# Patient Record
Sex: FEMALE | Race: WHITE | HISPANIC OR LATINO | Employment: UNEMPLOYED | URBAN - METROPOLITAN AREA
[De-identification: names, ages, dates, MRNs, and addresses within clinical notes are randomized per-mention and may not be internally consistent; named-entity substitution may affect disease eponyms.]

---

## 2024-05-01 PROBLEM — O40.3XX0 POLYHYDRAMNIOS IN THIRD TRIMESTER: Status: ACTIVE | Noted: 2024-05-01

## 2024-05-06 PROBLEM — B95.1 POSITIVE GBS TEST: Status: ACTIVE | Noted: 2024-05-06

## 2024-05-07 ENCOUNTER — ROUTINE PRENATAL (OUTPATIENT)
Dept: OBSTETRICS AND GYNECOLOGY | Age: 29
End: 2024-05-07
Payer: COMMERCIAL

## 2024-05-07 ENCOUNTER — TELEPHONE (OUTPATIENT)
Dept: OBSTETRICS AND GYNECOLOGY | Age: 29
End: 2024-05-07

## 2024-05-07 VITALS — SYSTOLIC BLOOD PRESSURE: 124 MMHG | WEIGHT: 150.6 LBS | BODY MASS INDEX: 27.55 KG/M2 | DIASTOLIC BLOOD PRESSURE: 80 MMHG

## 2024-05-07 DIAGNOSIS — Z3A.37 37 WEEKS GESTATION OF PREGNANCY: Primary | ICD-10-CM

## 2024-05-07 LAB
GLUCOSE UR STRIP-MCNC: NEGATIVE MG/DL
PROT UR STRIP-MCNC: NEGATIVE MG/DL

## 2024-05-07 PROCEDURE — 0502F SUBSEQUENT PRENATAL CARE: CPT | Performed by: OBSTETRICS & GYNECOLOGY

## 2024-05-10 ENCOUNTER — TRANSCRIBE ORDERS (OUTPATIENT)
Dept: ULTRASOUND IMAGING | Facility: HOSPITAL | Age: 29
End: 2024-05-10
Payer: COMMERCIAL

## 2024-05-10 DIAGNOSIS — O40.3XX1 POLYHYDRAMNIOS IN THIRD TRIMESTER COMPLICATION, FETUS 1 OF MULTIPLE GESTATION: Primary | ICD-10-CM

## 2024-05-14 ENCOUNTER — ANESTHESIA EVENT (OUTPATIENT)
Dept: LABOR AND DELIVERY | Facility: HOSPITAL | Age: 29
End: 2024-05-14
Payer: COMMERCIAL

## 2024-05-14 ENCOUNTER — HOSPITAL ENCOUNTER (INPATIENT)
Facility: HOSPITAL | Age: 29
LOS: 3 days | Discharge: HOME | End: 2024-05-17
Attending: OBSTETRICS & GYNECOLOGY | Admitting: OBSTETRICS & GYNECOLOGY
Payer: COMMERCIAL

## 2024-05-14 ENCOUNTER — ANESTHESIA (OUTPATIENT)
Dept: LABOR AND DELIVERY | Facility: HOSPITAL | Age: 29
End: 2024-05-14
Payer: COMMERCIAL

## 2024-05-14 ENCOUNTER — ROUTINE PRENATAL (OUTPATIENT)
Dept: OBSTETRICS AND GYNECOLOGY | Age: 29
End: 2024-05-14
Payer: COMMERCIAL

## 2024-05-14 VITALS — BODY MASS INDEX: 28.86 KG/M2 | WEIGHT: 157.8 LBS | SYSTOLIC BLOOD PRESSURE: 116 MMHG | DIASTOLIC BLOOD PRESSURE: 72 MMHG

## 2024-05-14 DIAGNOSIS — O28.9 ABNORMAL ANTENATAL TEST: ICD-10-CM

## 2024-05-14 DIAGNOSIS — Z3A.38 38 WEEKS GESTATION OF PREGNANCY: Primary | ICD-10-CM

## 2024-05-14 DIAGNOSIS — O40.3XX0 POLYHYDRAMNIOS IN THIRD TRIMESTER COMPLICATION, SINGLE OR UNSPECIFIED FETUS: ICD-10-CM

## 2024-05-14 PROBLEM — Z34.90 PREGNANCY: Status: ACTIVE | Noted: 2024-05-14

## 2024-05-14 PROBLEM — O36.8130 DECREASED FETAL MOVEMENTS IN THIRD TRIMESTER: Status: ACTIVE | Noted: 2024-05-14

## 2024-05-14 LAB
ABO GROUP BLD: NORMAL
ALBUMIN SERPL-MCNC: 3.3 G/DL (ref 3.5–5.2)
ALBUMIN/GLOB SERPL: 0.9 G/DL
ALP SERPL-CCNC: 275 U/L (ref 39–117)
ALT SERPL W P-5'-P-CCNC: 15 U/L (ref 1–33)
ANION GAP SERPL CALCULATED.3IONS-SCNC: 15.1 MMOL/L (ref 5–15)
AST SERPL-CCNC: 21 U/L (ref 1–32)
BASOPHILS # BLD AUTO: 0.04 10*3/MM3 (ref 0–0.2)
BASOPHILS NFR BLD AUTO: 0.4 % (ref 0–1.5)
BILIRUB SERPL-MCNC: 0.4 MG/DL (ref 0–1.2)
BLD GP AB SCN SERPL QL: NEGATIVE
BUN SERPL-MCNC: 8 MG/DL (ref 6–20)
BUN/CREAT SERPL: 14 (ref 7–25)
CALCIUM SPEC-SCNC: 8.9 MG/DL (ref 8.6–10.5)
CHLORIDE SERPL-SCNC: 101 MMOL/L (ref 98–107)
CO2 SERPL-SCNC: 19.9 MMOL/L (ref 22–29)
CREAT SERPL-MCNC: 0.57 MG/DL (ref 0.57–1)
DEPRECATED RDW RBC AUTO: 49.9 FL (ref 37–54)
EGFRCR SERPLBLD CKD-EPI 2021: 128.7 ML/MIN/1.73
EOSINOPHIL # BLD AUTO: 0.05 10*3/MM3 (ref 0–0.4)
EOSINOPHIL NFR BLD AUTO: 0.5 % (ref 0.3–6.2)
ERYTHROCYTE [DISTWIDTH] IN BLOOD BY AUTOMATED COUNT: 15 % (ref 12.3–15.4)
GLOBULIN UR ELPH-MCNC: 3.5 GM/DL
GLUCOSE SERPL-MCNC: 72 MG/DL (ref 65–99)
GLUCOSE UR STRIP-MCNC: NEGATIVE MG/DL
HCT VFR BLD AUTO: 42.8 % (ref 34–46.6)
HGB BLD-MCNC: 14.3 G/DL (ref 12–15.9)
IMM GRANULOCYTES # BLD AUTO: 0.05 10*3/MM3 (ref 0–0.05)
IMM GRANULOCYTES NFR BLD AUTO: 0.5 % (ref 0–0.5)
LYMPHOCYTES # BLD AUTO: 1.95 10*3/MM3 (ref 0.7–3.1)
LYMPHOCYTES NFR BLD AUTO: 20.3 % (ref 19.6–45.3)
MCH RBC QN AUTO: 30 PG (ref 26.6–33)
MCHC RBC AUTO-ENTMCNC: 33.4 G/DL (ref 31.5–35.7)
MCV RBC AUTO: 89.7 FL (ref 79–97)
MONOCYTES # BLD AUTO: 0.47 10*3/MM3 (ref 0.1–0.9)
MONOCYTES NFR BLD AUTO: 4.9 % (ref 5–12)
NEUTROPHILS NFR BLD AUTO: 7.06 10*3/MM3 (ref 1.7–7)
NEUTROPHILS NFR BLD AUTO: 73.4 % (ref 42.7–76)
NRBC BLD AUTO-RTO: 0 /100 WBC (ref 0–0.2)
PLATELET # BLD AUTO: 240 10*3/MM3 (ref 140–450)
PMV BLD AUTO: 11.1 FL (ref 6–12)
POTASSIUM SERPL-SCNC: 3.8 MMOL/L (ref 3.5–5.2)
PROT SERPL-MCNC: 6.8 G/DL (ref 6–8.5)
PROT UR STRIP-MCNC: NEGATIVE MG/DL
RBC # BLD AUTO: 4.77 10*6/MM3 (ref 3.77–5.28)
RH BLD: POSITIVE
SODIUM SERPL-SCNC: 136 MMOL/L (ref 136–145)
T PALLIDUM IGG SER QL: NORMAL
T&S EXPIRATION DATE: NORMAL
WBC NRBC COR # BLD AUTO: 9.62 10*3/MM3 (ref 3.4–10.8)

## 2024-05-14 PROCEDURE — 25810000003 LACTATED RINGERS PER 1000 ML: Performed by: OBSTETRICS & GYNECOLOGY

## 2024-05-14 PROCEDURE — 86780 TREPONEMA PALLIDUM: CPT | Performed by: OBSTETRICS & GYNECOLOGY

## 2024-05-14 PROCEDURE — 86900 BLOOD TYPING SEROLOGIC ABO: CPT | Performed by: OBSTETRICS & GYNECOLOGY

## 2024-05-14 PROCEDURE — 85025 COMPLETE CBC W/AUTO DIFF WBC: CPT | Performed by: OBSTETRICS & GYNECOLOGY

## 2024-05-14 PROCEDURE — 3E0P7VZ INTRODUCTION OF HORMONE INTO FEMALE REPRODUCTIVE, VIA NATURAL OR ARTIFICIAL OPENING: ICD-10-PCS | Performed by: OBSTETRICS & GYNECOLOGY

## 2024-05-14 PROCEDURE — 80053 COMPREHEN METABOLIC PANEL: CPT | Performed by: OBSTETRICS & GYNECOLOGY

## 2024-05-14 PROCEDURE — 86901 BLOOD TYPING SEROLOGIC RH(D): CPT | Performed by: OBSTETRICS & GYNECOLOGY

## 2024-05-14 PROCEDURE — 86850 RBC ANTIBODY SCREEN: CPT | Performed by: OBSTETRICS & GYNECOLOGY

## 2024-05-14 PROCEDURE — 25010000002 MORPHINE PER 10 MG: Performed by: OBSTETRICS & GYNECOLOGY

## 2024-05-14 RX ORDER — MORPHINE SULFATE 2 MG/ML
2 INJECTION, SOLUTION INTRAMUSCULAR; INTRAVENOUS
Status: DISCONTINUED | OUTPATIENT
Start: 2024-05-14 | End: 2024-05-15

## 2024-05-14 RX ORDER — ACETAMINOPHEN 325 MG/1
650 TABLET ORAL EVERY 4 HOURS PRN
Status: DISCONTINUED | OUTPATIENT
Start: 2024-05-14 | End: 2024-05-15

## 2024-05-14 RX ORDER — SODIUM CHLORIDE 0.9 % (FLUSH) 0.9 %
10 SYRINGE (ML) INJECTION AS NEEDED
Status: DISCONTINUED | OUTPATIENT
Start: 2024-05-14 | End: 2024-05-15

## 2024-05-14 RX ORDER — FAMOTIDINE 10 MG/ML
20 INJECTION, SOLUTION INTRAVENOUS 2 TIMES DAILY PRN
Status: DISCONTINUED | OUTPATIENT
Start: 2024-05-14 | End: 2024-05-15

## 2024-05-14 RX ORDER — LIDOCAINE HYDROCHLORIDE 10 MG/ML
0.5 INJECTION, SOLUTION INFILTRATION; PERINEURAL ONCE AS NEEDED
Status: DISCONTINUED | OUTPATIENT
Start: 2024-05-14 | End: 2024-05-15

## 2024-05-14 RX ORDER — FENTANYL/ROPIVACAINE/NS/PF 2MCG/ML-.2
10 PLASTIC BAG, INJECTION (ML) INJECTION CONTINUOUS
Status: DISCONTINUED | OUTPATIENT
Start: 2024-05-14 | End: 2024-05-15

## 2024-05-14 RX ORDER — FAMOTIDINE 20 MG/1
20 TABLET, FILM COATED ORAL 2 TIMES DAILY PRN
Status: DISCONTINUED | OUTPATIENT
Start: 2024-05-14 | End: 2024-05-15

## 2024-05-14 RX ORDER — TERBUTALINE SULFATE 1 MG/ML
0.25 INJECTION, SOLUTION SUBCUTANEOUS AS NEEDED
Status: DISCONTINUED | OUTPATIENT
Start: 2024-05-14 | End: 2024-05-15

## 2024-05-14 RX ORDER — SODIUM CHLORIDE, SODIUM LACTATE, POTASSIUM CHLORIDE, CALCIUM CHLORIDE 600; 310; 30; 20 MG/100ML; MG/100ML; MG/100ML; MG/100ML
125 INJECTION, SOLUTION INTRAVENOUS CONTINUOUS
Status: DISCONTINUED | OUTPATIENT
Start: 2024-05-14 | End: 2024-05-15

## 2024-05-14 RX ORDER — PENICILLIN G 3000000 [IU]/50ML
3 INJECTION, SOLUTION INTRAVENOUS EVERY 4 HOURS
Qty: 600 ML | Refills: 0 | Status: DISCONTINUED | OUTPATIENT
Start: 2024-05-15 | End: 2024-05-15

## 2024-05-14 RX ORDER — ONDANSETRON 2 MG/ML
4 INJECTION INTRAMUSCULAR; INTRAVENOUS EVERY 6 HOURS PRN
Status: DISCONTINUED | OUTPATIENT
Start: 2024-05-14 | End: 2024-05-15

## 2024-05-14 RX ORDER — EPHEDRINE SULFATE 50 MG/ML
10 INJECTION, SOLUTION INTRAVENOUS
Status: DISCONTINUED | OUTPATIENT
Start: 2024-05-14 | End: 2024-05-15

## 2024-05-14 RX ORDER — ONDANSETRON 4 MG/1
4 TABLET, ORALLY DISINTEGRATING ORAL EVERY 6 HOURS PRN
Status: DISCONTINUED | OUTPATIENT
Start: 2024-05-14 | End: 2024-05-15

## 2024-05-14 RX ORDER — PENICILLIN G 3000000 [IU]/50ML
3 INJECTION, SOLUTION INTRAVENOUS EVERY 4 HOURS
Status: DISCONTINUED | OUTPATIENT
Start: 2024-05-14 | End: 2024-05-14

## 2024-05-14 RX ORDER — SODIUM CHLORIDE 0.9 % (FLUSH) 0.9 %
10 SYRINGE (ML) INJECTION EVERY 12 HOURS SCHEDULED
Status: DISCONTINUED | OUTPATIENT
Start: 2024-05-14 | End: 2024-05-15

## 2024-05-14 RX ORDER — MAGNESIUM CARB/ALUMINUM HYDROX 105-160MG
30 TABLET,CHEWABLE ORAL ONCE AS NEEDED
Status: DISCONTINUED | OUTPATIENT
Start: 2024-05-14 | End: 2024-05-15

## 2024-05-14 RX ORDER — NALOXONE HCL 0.4 MG/ML
0.4 VIAL (ML) INJECTION
Status: DISCONTINUED | OUTPATIENT
Start: 2024-05-14 | End: 2024-05-15

## 2024-05-14 RX ORDER — SODIUM CHLORIDE 9 MG/ML
40 INJECTION, SOLUTION INTRAVENOUS AS NEEDED
Status: DISCONTINUED | OUTPATIENT
Start: 2024-05-14 | End: 2024-05-15

## 2024-05-14 RX ADMIN — MORPHINE SULFATE 2 MG: 2 INJECTION, SOLUTION INTRAMUSCULAR; INTRAVENOUS at 20:18

## 2024-05-14 RX ADMIN — DINOPROSTONE 10 MG: 10 INSERT VAGINAL at 12:50

## 2024-05-14 RX ADMIN — SODIUM CHLORIDE, POTASSIUM CHLORIDE, SODIUM LACTATE AND CALCIUM CHLORIDE 125 ML/HR: 600; 310; 30; 20 INJECTION, SOLUTION INTRAVENOUS at 21:00

## 2024-05-14 NOTE — H&P
[facility]  Obstetric History and Physical    Chief Complaint   Patient presents with    Scheduled Induction     Pt was sent off from the office for and induction for BPP 4/8       Subjective     Patient is a 28 y.o. female  currently at 38w0d, who presented to office for routine visit. She notes fetal movement but reports it is decreased since last visit. Her BPP done for polyhydramnios was 4/8, -2 for lack of tone and -2 for lack of appropriate movements. Pt was counseled regarding options for NST and repeat BPP vs IOL for decreased movement and BPP 4/8. Risks of IOL discussed including prolonged induction due to unfavorable cervix. Counseling was performed using office interpretor. Pt considered and desired to proceed with IOL today. She notes irregular ctx. She denies bleeding or leaking fluid.    Her prenatal care is complicated by   Patient Active Problem List   Diagnosis    Maternal anemia in pregnancy, antepartum    Polyhydramnios in third trimester    Positive GBS test    Abnormal  test    Pregnancy    Decreased fetal movements in third trimester     Her previous obstetric/gynecological history is noted for is non-contributory.    The following portions of the patients history were reviewed and updated as appropriate: current medications, allergies, past medical history, past surgical history, past family history, past social history, and problem list .       Prenatal Information:  Prenatal Results       Initial Prenatal Labs       Test Value Reference Range Date Time    Hemoglobin ^ 10.8 g/dL  10/26/23     Hematocrit ^ 38 %  10/26/23     Platelets ^ 344 K/µL  10/26/23     Rubella IgG ^ immune   10/26/23     Hepatitis B SAg ^ Negative   10/26/23     Hepatitis C Ab ^ non reactive   10/26/23     RPR  Non Reactive  Non Reactive 24 1059      ^ Non-Reactive   10/26/23     T. Pallidum Ab         ABO ^ O   10/26/23     Rh ^ Positive   10/26/23     Antibody Screen ^ Normal  Normal 10/26/23      HIV ^ negative   10/26/23     Urine Culture ^ No growth  No growth at 24 hours, No growth at 2 days, No growth at 3 days, No growth, Growth present, too young to evaluate, Culture in progress 12/13/23     Gonorrhea ^ negative   10/26/23     Chlamydia ^ negative   10/26/23     TSH        HgB A1c   5.40 % 4.80 - 5.60 05/01/24 1519    Varicella IgG ^ positive   10/26/23     HgB Electrophoresis  ^ normal   10/26/23     Cystic fibrosis                   Fetal testing        Test Value Reference Range Date Time    NIPT        MSAFP        AFP-4 ^ negative   12/13/23               2nd and 3rd Trimester       Test Value Reference Range Date Time    Hemoglobin (repeated)  14.3 g/dL 12.0 - 15.9 05/14/24 1157       12.2 g/dL 12.0 - 15.9 03/05/24 1059    Hematocrit (repeated)  42.8 % 34.0 - 46.6 05/14/24 1157       36.7 % 34.0 - 46.6 03/05/24 1059    Platelets   240 10*3/mm3 140 - 450 05/14/24 1157       248 10*3/mm3 140 - 450 03/05/24 1059      ^ 344 K/µL  10/26/23     GCT  121 mg/dL 65 - 139 03/05/24 1059    Antibody Screen (repeated)        3rd TM syphilis scrn (repeated)  RPR   Non Reactive  Non Reactive 03/05/24 1059    3rd TM syphilis scrn (repeated) FTA        GTT Fasting        GTT 1 Hr        GTT 2 Hr        GTT 3 Hr        Group B Strep  Positive  Negative 05/01/24 1556              Other testing        Test Value Reference Range Date Time    Parvo IgG         CMV IgG                   Drug Screening       Test Value Reference Range Date Time    Amphetamine Screen        Barbiturate Screen        Benzodiazepine Screen        Methadone Screen        Phencyclidine Screen        Opiates Screen        THC Screen        Cocaine Screen        Propoxyphene Screen        Buprenorphine Screen        Methamphetamine Screen        Oxycodone Screen        Tricyclic Antidepressants Screen                  Legend    ^: Historical                          External Prenatal Results       Pregnancy Outside Results - Transcribed  From Office Records - See Scanned Records For Details       Test Value Date Time    ABO ^ O  10/26/23     Rh ^ Positive  10/26/23     Antibody Screen ^ Normal  10/26/23     Varicella IgG ^ positive  10/26/23     Rubella ^ immune  10/26/23     Hgb  14.3 g/dL 24 1157       12.2 g/dL 24 1059      ^ 10.8 g/dL 10/26/23     Hct  42.8 % 24 1157       36.7 % 24 1059      ^ 38 % 10/26/23     Glucose Fasting GTT       Glucose Tolerance Test 1 hour       Glucose Tolerance Test 3 hour       Gonorrhea (discrete) ^ negative  10/26/23     Chlamydia (discrete) ^ negative  10/26/23     RPR  Non Reactive  24 1059      ^ Non-Reactive  10/26/23     VDRL       Syphilis Antibody       HBsAg ^ Negative  10/26/23     Herpes Simplex Virus PCR       Herpes Simplex VIrus Culture       HIV ^ negative  10/26/23     Hep C RNA Quant PCR       Hep C Antibody ^ non reactive  10/26/23     AFP       Group B Strep  Positive  24 1556    GBS Susceptibility to Clindamycin       GBS Susceptibility to Erythromycin       Fetal Fibronectin       Genetic Testing, Maternal Blood                 Drug Screening       Test Value Date Time    NIPT        Urine Drug Screen       Amphetamine Screen       Barbiturate Screen       Benzodiazepine Screen       Methadone Screen       Phencyclidine Screen       Opiates Screen       THC Screen       Cocaine Screen       Propoxyphene Screen       Buprenorphine Screen       Methamphetamine Screen       Oxycodone Screen                 Legend    ^: Historical                             Past OB History:     OB History    Para Term  AB Living   2 0 0 0 1 0   SAB IAB Ectopic Molar Multiple Live Births   1 0 0 0 0 0      # Outcome Date GA Lbr Ernesto/2nd Weight Sex Type Anes PTL Lv   2 Current            1 2018 4w0d    SAB          Past Medical History: History reviewed. No pertinent past medical history.   Past Surgical History Past Surgical History:   Procedure Laterality Date     DILATATION AND CURETTAGE        Family History: Family History   Problem Relation Age of Onset    Breast cancer Neg Hx     Ovarian cancer Neg Hx     Uterine cancer Neg Hx     Colon cancer Neg Hx       Social History:  reports that she has never smoked. She does not have any smokeless tobacco history on file.   reports that she does not currently use alcohol.   reports no history of drug use.        General ROS: Pertinent items are noted in HPI    Objective       Vital Signs Range for the last 24 hours  Temperature: Temp:  [98.8 °F (37.1 °C)] 98.8 °F (37.1 °C)   Temp Source: Temp src: Oral   BP: BP: (113-116)/(72-76) 113/76   Pulse: Heart Rate:  [65-72] 72   Respirations: Resp:  [16] 16   SPO2: SpO2:  [100 %] 100 %   O2 Amount (l/min):     O2 Devices     Weight: Weight:  [71.6 kg (157 lb 12.8 oz)] 71.6 kg (157 lb 12.8 oz)     Physical Examination: General appearance - alert, well appearing, and in no distress  Mental status - alert, oriented to person, place, and time  Chest - clear to auscultation, no wheezes, rales or rhonchi, symmetric air entry  Heart - normal rate and regular rhythm  Abdomen - soft, gravid, nontender  Neurological - alert, oriented, normal speech, no focal findings or movement disorder noted  Extremities - bilateral lower ext are without cords, edema or tenderness  Skin - normal coloration and turgor,    Presentation: vtx   Cervix: Exam by:  MD   Dilation:  closed   Effacement:  60   Station:  -2       Fetal Heart Rate Assessment    bpm    Uterine Assessment   Uterine soft with palpation      Radiology Review: BPP in office , -2 for lack of tone, -2 for lack of appropriate fetal movement, BRUNA 25      Assessment & Plan       Pregnancy    Polyhydramnios in third trimester    Positive GBS test    Abnormal  test    Decreased fetal movements in third trimester        Assessment:  1.  Intrauterine pregnancy at 38w0d gestation with reactive fetal status.    2.  induction of labor   for decreased fetal movement with BPP 4/8  with unfavorable cervix  3.  Obstetrical history significant for is non-contributory.  4.  GBS status:   Strep Gp B BETO   Date Value Ref Range Status   2024 Positive (A) Negative Final     Comment:     Centers for Disease Control and Prevention (CDC) and American Congress  of Obstetricians and Gynecologists (ACOG) guidelines for prevention of   group B streptococcal (GBS) disease specify co-collection of  a vaginal and rectal swab specimen to maximize sensitivity of GBS  detection. Per the CDC and ACOG, swabbing both the lower vagina and  rectum substantially increases the yield of detection compared with  sampling the vagina alone.  Penicillin G, ampicillin, or cefazolin are indicated for intrapartum  prophylaxis of  GBS colonization. Reflex susceptibility  testing should be performed prior to use of clindamycin only on GBS  isolates from penicillin-allergic women who are considered a high risk  for anaphylaxis. Treatment with vancomycin without additional testing  is warranted if resistance to clindamycin is noted.         Plan:  1. fetal and uterine monitoring  continuously, cervical ripening with  Cervidil, and antibiotic for GBS  2. Plan of care has been reviewed with patient and partner using [place] interpretor from office  3.  Risks, benefits of treatment plan have been discussed.  4.  All questions have been answered.      Mattie Prince MD  2024  12:41 EDT

## 2024-05-14 NOTE — PROGRESS NOTES
CC: Ob visit  HPI: pt presents for routine visit. She notes fetal movement but reports the larger movements/kicks seem decreased. She is feeling mild ctx. She denies bleeding/leaking fluid.     O: BPP 4/8, -2 for tone and movement  BRUNA 25    A/p: decreased movement with BPP 4/8: reviewed options of NST and observation with repeat BPP tomorrow vs IOL. Pt considered and desires IOL. She is aware this can take several days with unfavorable cervix. Risks reviewed.     Polyhydramnios: IOL due to decreased fetal movement/abnormal BPP.     GBS +: PCN in labor      Interpretor from office present for all counseling.

## 2024-05-15 LAB
ATMOSPHERIC PRESS: 741.9 MMHG
ATMOSPHERIC PRESS: 743.5 MMHG
BASE EXCESS BLDCOA CALC-SCNC: -11.2 MMOL/L (ref -2–2)
BASE EXCESS BLDCOV CALC-SCNC: -0.9 MMOL/L (ref -30–30)
BDY SITE: ABNORMAL
BDY SITE: ABNORMAL
CO2 BLDA-SCNC: 15.2 MMOL/L (ref 23–27)
CO2 BLDA-SCNC: 27.3 MMOL/L (ref 23–27)
HCO3 BLDCOA-SCNC: 14.3 MMOL/L (ref 22–28)
HCO3 BLDCOV-SCNC: 25.8 MMOL/L
MODALITY: ABNORMAL
MODALITY: ABNORMAL
NOTIFIED WHO: ABNORMAL
PCO2 BLDCOA: 29 MMHG (ref 43–63)
PCO2 BLDCOV: 48.6 MM HG (ref 35–51.3)
PH BLDCOA: 7.3 PH UNITS (ref 7.18–7.34)
PH BLDCOV: 7.33 PH UNITS (ref 7.26–7.4)
PO2 BLDCOA: 152 MMHG (ref 12–26)
PO2 BLDCOV: 28 MM HG (ref 19–39)
SAO2 % BLDCOA: 99.2 %
SAO2 % BLDCOV: ABNORMAL %

## 2024-05-15 PROCEDURE — 59400 OBSTETRICAL CARE: CPT | Performed by: OBSTETRICS & GYNECOLOGY

## 2024-05-15 PROCEDURE — 25810000003 LACTATED RINGERS PER 1000 ML: Performed by: OBSTETRICS & GYNECOLOGY

## 2024-05-15 PROCEDURE — C1755 CATHETER, INTRASPINAL: HCPCS | Performed by: ANESTHESIOLOGY

## 2024-05-15 PROCEDURE — 88307 TISSUE EXAM BY PATHOLOGIST: CPT

## 2024-05-15 PROCEDURE — 0UQMXZZ REPAIR VULVA, EXTERNAL APPROACH: ICD-10-PCS | Performed by: OBSTETRICS & GYNECOLOGY

## 2024-05-15 PROCEDURE — 82803 BLOOD GASES ANY COMBINATION: CPT

## 2024-05-15 PROCEDURE — 25010000002 LIDOCAINE 1 % SOLUTION: Performed by: ANESTHESIOLOGY

## 2024-05-15 PROCEDURE — 25010000002 PENICILLIN G POTASSIUM PER 600000 UNITS: Performed by: OBSTETRICS & GYNECOLOGY

## 2024-05-15 RX ORDER — HYDROCODONE BITARTRATE AND ACETAMINOPHEN 10; 325 MG/1; MG/1
1 TABLET ORAL EVERY 4 HOURS PRN
Status: DISCONTINUED | OUTPATIENT
Start: 2024-05-15 | End: 2024-05-17 | Stop reason: HOSPADM

## 2024-05-15 RX ORDER — TRANEXAMIC ACID 10 MG/ML
1000 INJECTION, SOLUTION INTRAVENOUS ONCE AS NEEDED
Status: DISCONTINUED | OUTPATIENT
Start: 2024-05-15 | End: 2024-05-15

## 2024-05-15 RX ORDER — HYDROCORTISONE 25 MG/G
1 CREAM TOPICAL AS NEEDED
Status: DISCONTINUED | OUTPATIENT
Start: 2024-05-15 | End: 2024-05-17 | Stop reason: HOSPADM

## 2024-05-15 RX ORDER — OXYTOCIN/0.9 % SODIUM CHLORIDE 30/500 ML
125 PLASTIC BAG, INJECTION (ML) INTRAVENOUS ONCE AS NEEDED
Status: COMPLETED | OUTPATIENT
Start: 2024-05-15 | End: 2024-05-15

## 2024-05-15 RX ORDER — OXYTOCIN/0.9 % SODIUM CHLORIDE 30/500 ML
2-20 PLASTIC BAG, INJECTION (ML) INTRAVENOUS
Status: DISCONTINUED | OUTPATIENT
Start: 2024-05-15 | End: 2024-05-15

## 2024-05-15 RX ORDER — METHYLERGONOVINE MALEATE 0.2 MG/ML
200 INJECTION INTRAVENOUS ONCE AS NEEDED
Status: DISCONTINUED | OUTPATIENT
Start: 2024-05-15 | End: 2024-05-15

## 2024-05-15 RX ORDER — DOCUSATE SODIUM 100 MG/1
100 CAPSULE, LIQUID FILLED ORAL 2 TIMES DAILY
Status: DISCONTINUED | OUTPATIENT
Start: 2024-05-15 | End: 2024-05-17 | Stop reason: HOSPADM

## 2024-05-15 RX ORDER — IBUPROFEN 600 MG/1
600 TABLET ORAL EVERY 6 HOURS PRN
Status: DISCONTINUED | OUTPATIENT
Start: 2024-05-15 | End: 2024-05-17 | Stop reason: HOSPADM

## 2024-05-15 RX ORDER — ACETAMINOPHEN 325 MG/1
650 TABLET ORAL EVERY 6 HOURS PRN
Status: DISCONTINUED | OUTPATIENT
Start: 2024-05-15 | End: 2024-05-17 | Stop reason: HOSPADM

## 2024-05-15 RX ORDER — HYDROCODONE BITARTRATE AND ACETAMINOPHEN 5; 325 MG/1; MG/1
1 TABLET ORAL EVERY 4 HOURS PRN
Status: DISCONTINUED | OUTPATIENT
Start: 2024-05-15 | End: 2024-05-17 | Stop reason: HOSPADM

## 2024-05-15 RX ORDER — MISOPROSTOL 200 UG/1
800 TABLET ORAL ONCE AS NEEDED
Status: DISCONTINUED | OUTPATIENT
Start: 2024-05-15 | End: 2024-05-15

## 2024-05-15 RX ORDER — BISACODYL 10 MG
10 SUPPOSITORY, RECTAL RECTAL DAILY PRN
Status: DISCONTINUED | OUTPATIENT
Start: 2024-05-16 | End: 2024-05-17 | Stop reason: HOSPADM

## 2024-05-15 RX ORDER — OXYTOCIN/0.9 % SODIUM CHLORIDE 30/500 ML
250 PLASTIC BAG, INJECTION (ML) INTRAVENOUS CONTINUOUS
Status: ACTIVE | OUTPATIENT
Start: 2024-05-15 | End: 2024-05-15

## 2024-05-15 RX ORDER — TRANEXAMIC ACID 10 MG/ML
1000 INJECTION, SOLUTION INTRAVENOUS ONCE AS NEEDED
Status: DISCONTINUED | OUTPATIENT
Start: 2024-05-15 | End: 2024-05-17 | Stop reason: HOSPADM

## 2024-05-15 RX ORDER — LIDOCAINE HYDROCHLORIDE 10 MG/ML
INJECTION, SOLUTION INFILTRATION; PERINEURAL AS NEEDED
Status: DISCONTINUED | OUTPATIENT
Start: 2024-05-15 | End: 2024-05-15 | Stop reason: SURG

## 2024-05-15 RX ORDER — MISOPROSTOL 200 UG/1
600 TABLET ORAL ONCE AS NEEDED
Status: DISCONTINUED | OUTPATIENT
Start: 2024-05-15 | End: 2024-05-17 | Stop reason: HOSPADM

## 2024-05-15 RX ORDER — SODIUM CHLORIDE 0.9 % (FLUSH) 0.9 %
1-10 SYRINGE (ML) INJECTION AS NEEDED
Status: DISCONTINUED | OUTPATIENT
Start: 2024-05-15 | End: 2024-05-17 | Stop reason: HOSPADM

## 2024-05-15 RX ORDER — PRENATAL VIT/IRON FUM/FOLIC AC 27MG-0.8MG
1 TABLET ORAL DAILY
Status: DISCONTINUED | OUTPATIENT
Start: 2024-05-15 | End: 2024-05-17 | Stop reason: HOSPADM

## 2024-05-15 RX ORDER — OXYTOCIN/0.9 % SODIUM CHLORIDE 30/500 ML
999 PLASTIC BAG, INJECTION (ML) INTRAVENOUS ONCE
Status: DISCONTINUED | OUTPATIENT
Start: 2024-05-15 | End: 2024-05-15 | Stop reason: HOSPADM

## 2024-05-15 RX ORDER — CARBOPROST TROMETHAMINE 250 UG/ML
250 INJECTION, SOLUTION INTRAMUSCULAR
Status: DISCONTINUED | OUTPATIENT
Start: 2024-05-15 | End: 2024-05-15

## 2024-05-15 RX ORDER — METHYLERGONOVINE MALEATE 0.2 MG/ML
200 INJECTION INTRAVENOUS ONCE AS NEEDED
Status: DISCONTINUED | OUTPATIENT
Start: 2024-05-15 | End: 2024-05-17 | Stop reason: HOSPADM

## 2024-05-15 RX ADMIN — SODIUM CHLORIDE, POTASSIUM CHLORIDE, SODIUM LACTATE AND CALCIUM CHLORIDE 125 ML/HR: 600; 310; 30; 20 INJECTION, SOLUTION INTRAVENOUS at 02:00

## 2024-05-15 RX ADMIN — Medication 2 MILLI-UNITS/MIN: at 07:23

## 2024-05-15 RX ADMIN — PENICILLIN G POTASSIUM 5 MILLION UNITS: 5000000 INJECTION, POWDER, FOR SOLUTION INTRAMUSCULAR; INTRAVENOUS at 04:22

## 2024-05-15 RX ADMIN — PENICILLIN G 3 MILLION UNITS: 3000000 INJECTION, SOLUTION INTRAVENOUS at 08:34

## 2024-05-15 RX ADMIN — Medication 10 ML/HR: at 04:06

## 2024-05-15 RX ADMIN — DOCUSATE SODIUM 100 MG: 100 CAPSULE, LIQUID FILLED ORAL at 19:55

## 2024-05-15 RX ADMIN — Medication 125 ML/HR: at 13:35

## 2024-05-15 RX ADMIN — LIDOCAINE HYDROCHLORIDE 5 ML: 10 INJECTION, SOLUTION INFILTRATION; PERINEURAL at 04:02

## 2024-05-15 RX ADMIN — LIDOCAINE HYDROCHLORIDE 4 ML: 10; .005 INJECTION, SOLUTION EPIDURAL; INFILTRATION; INTRACAUDAL; PERINEURAL at 03:59

## 2024-05-15 RX ADMIN — SODIUM CHLORIDE, POTASSIUM CHLORIDE, SODIUM LACTATE AND CALCIUM CHLORIDE 125 ML/HR: 600; 310; 30; 20 INJECTION, SOLUTION INTRAVENOUS at 04:26

## 2024-05-15 RX ADMIN — SODIUM CHLORIDE, POTASSIUM CHLORIDE, SODIUM LACTATE AND CALCIUM CHLORIDE 125 ML/HR: 600; 310; 30; 20 INJECTION, SOLUTION INTRAVENOUS at 11:07

## 2024-05-15 NOTE — PLAN OF CARE
Goal Outcome Evaluation:              Outcome Evaluation: Pain controlled. VSS. Voiding without difficulty. Ambulating well. Breastfeeding.

## 2024-05-15 NOTE — PLAN OF CARE
Problem: Adult Inpatient Plan of Care  Goal: Plan of Care Review  Outcome: Ongoing, Progressing  Flowsheets (Taken 5/15/2024 0619)  Progress: improving  Plan of Care Reviewed With: patient  Outcome Evaluation: Pt. admitted to L&D for IOL. SROM at 0335. Pt. requested epidural for pain control. Pt. comfortable at this time. Pt. plans for vaginal delivery. VSS.  Goal: Patient-Specific Goal (Individualized)  Outcome: Ongoing, Progressing  Flowsheets (Taken 5/15/2024 0621)  Patient-Specific Goals (Include Timeframe): Healthy mom and baby by discharge  Individualized Care Needs: Breast feeding  Anxieties, Fears or Concerns: First baby  Goal: Absence of Hospital-Acquired Illness or Injury  Outcome: Ongoing, Progressing  Intervention: Identify and Manage Fall Risk  Recent Flowsheet Documentation  Taken 5/15/2024 0415 by Jess Sharma RN  Safety Promotion/Fall Prevention: safety round/check completed  Intervention: Prevent and Manage VTE (Venous Thromboembolism) Risk  Recent Flowsheet Documentation  Taken 5/15/2024 0415 by Jess Sharma RN  Range of Motion: active ROM (range of motion) encouraged  Goal: Optimal Comfort and Wellbeing  Outcome: Ongoing, Progressing  Intervention: Monitor Pain and Promote Comfort  Recent Flowsheet Documentation  Taken 5/15/2024 0320 by Jess Sharma RN  Pain Management Interventions: (Pt. requested pain medication, upon returning with ordered Morphine, pt. water broke and pt. requested epidural for pain managemnent rather than Morphine. Anes. called for epidural placement) other (see comments)  Intervention: Provide Person-Centered Care  Recent Flowsheet Documentation  Taken 5/15/2024 0415 by Jess Sharma RN  Trust Relationship/Rapport:   care explained   choices provided   emotional support provided   empathic listening provided   questions answered   questions encouraged   reassurance provided   thoughts/feelings acknowledged  Goal: Readiness for Transition of Care  Outcome:  Ongoing, Progressing     Problem: Bleeding (Labor)  Goal: Hemostasis  Outcome: Ongoing, Progressing     Problem: Change in Fetal Wellbeing (Labor)  Goal: Stable Fetal Wellbeing  Outcome: Ongoing, Progressing     Problem: Delayed Labor Progression (Labor)  Goal: Effective Progression to Delivery  Outcome: Ongoing, Progressing     Problem: Infection (Labor)  Goal: Absence of Infection Signs and Symptoms  Outcome: Ongoing, Progressing     Problem: Labor Pain (Labor)  Goal: Acceptable Pain Control  Outcome: Ongoing, Progressing     Problem: Uterine Tachysystole (Labor)  Goal: Normal Uterine Contraction Pattern  Outcome: Ongoing, Progressing     Problem: Skin Injury Risk Increased  Goal: Skin Health and Integrity  Outcome: Ongoing, Progressing     Problem: Device-Related Complication Risk (Anesthesia/Analgesia, Neuraxial)  Goal: Safe Infusion Delivery Completion  Outcome: Ongoing, Progressing     Problem: Infection (Anesthesia/Analgesia, Neuraxial)  Goal: Absence of Infection Signs and Symptoms  Outcome: Ongoing, Progressing     Problem: Nausea and Vomiting (Anesthesia/Analgesia, Neuraxial)  Goal: Nausea and Vomiting Relief  Outcome: Ongoing, Progressing     Problem: Pain (Anesthesia/Analgesia, Neuraxial)  Goal: Effective Pain Control  Outcome: Ongoing, Progressing  Intervention: Prevent or Manage Pain  Recent Flowsheet Documentation  Taken 5/15/2024 0320 by Jess Sharma, RN  Pain Management Interventions: (Pt. requested pain medication, upon returning with ordered Morphine, pt. water broke and pt. requested epidural for pain managemnent rather than Morphine. Anes. called for epidural placement) other (see comments)     Problem: Respiratory Compromise (Anesthesia/Analgesia, Neuraxial)  Goal: Effective Oxygenation and Ventilation  Outcome: Ongoing, Progressing     Problem: Sensorimotor Impairment (Anesthesia/Analgesia, Neuraxial)  Goal: Baseline Motor Function  Outcome: Ongoing, Progressing  Intervention: Optimize  Sensorimotor Function  Recent Flowsheet Documentation  Taken 5/15/2024 0415 by Jess Sharma RN  Safety Promotion/Fall Prevention: safety round/check completed     Problem: Urinary Retention (Anesthesia/Analgesia, Neuraxial)  Goal: Effective Urinary Elimination  Outcome: Ongoing, Progressing     Problem:  Fall Injury Risk  Goal: Absence of Fall, Infant Drop and Related Injury  Outcome: Ongoing, Progressing  Intervention: Promote Injury-Free Environment  Recent Flowsheet Documentation  Taken 5/15/2024 0415 by Jess Sharma RN  Safety Promotion/Fall Prevention: safety round/check completed   Goal Outcome Evaluation:  Plan of Care Reviewed With: patient        Progress: improving  Outcome Evaluation: Pt. admitted to L&D for IOL. SROM at 0335. Pt. requested epidural for pain control. Pt. comfortable at this time. Pt. plans for vaginal delivery. VSS.

## 2024-05-15 NOTE — ANESTHESIA POSTPROCEDURE EVALUATION
"Patient: Nicho George    Procedure Summary       Date: 05/15/24 Room / Location:     Anesthesia Start: 0349 Anesthesia Stop: 1214    Procedure: LABOR ANALGESIA Diagnosis:     Scheduled Providers:  Provider: Edmundo Muñoz MD    Anesthesia Type: epidural ASA Status: 2            Anesthesia Type: epidural    Vitals  Vitals Value Taken Time   /168 05/15/24 1217   Temp 36.7 °C (98.1 °F) 05/15/24 0935   Pulse 73 05/15/24 1220   Resp 17 05/15/24 1040   SpO2 98 % 05/15/24 1220   Vitals shown include unfiled device data.        Post Anesthesia Care and Evaluation    Anesthetic complications: No anesthetic complications    Comments: /70   Pulse 62   Temp 36.7 °C (98.1 °F) (Oral)   Resp 17   Ht 157.5 cm (62\")   LMP 08/22/2023   SpO2 97%   Breastfeeding Yes   BMI 28.86 kg/m²     No anesthesia complications reported to me.    "

## 2024-05-15 NOTE — ANESTHESIA PROCEDURE NOTES
Labor Epidural      Patient reassessed immediately prior to procedure    Patient location during procedure: OB  Performed By  Anesthesiologist: Edmundo Muñoz MD  Preanesthetic Checklist  Completed: patient identified, IV checked, site marked, risks and benefits discussed, surgical consent, monitors and equipment checked, pre-op evaluation and timeout performed  Additional Notes  Test dose 4 cc 1% lidocaine with epi.  Second dose 5cc 1% lidocaine then continuous infusion o.2% Ropivicaine with 2 Mics fentanyl per cc at 10 cc hr, with  6cc PCA, 15 min lockout  Prep:  Pt Position:sitting  Sterile Tech:gloves, cap, mask and sterile barrier  Prep:chlorhexidine gluconate and isopropyl alcohol  Monitoring:blood pressure monitoring, continuous pulse oximetry and EKG  Epidural Block Procedure:  Approach:midline  Guidance:landmark technique  Location:L4-L5  Needle Type:Tuohy  Needle Gauge:17 G  Loss of Resistance Medium: air  Paresthesia: none  Aspiration:negative  Test Dose:negative  Number of Attempts: 1  Post Assessment:  Dressing:occlusive dressing applied and secured with tape  Pt Tolerance:patient tolerated the procedure well with no apparent complications  Complications:no

## 2024-05-15 NOTE — LACTATION NOTE
This note was copied from a baby's chart.  P1T-new admission    Patient reports infant fed twice after delivery and she is able to express colostrum easily.  She is unsure how much milk infant is taking in.  Infant is calm and sleeping in skin to skin now, reassured patient.  Reviewed with patient to offer breast at least every 3 hours or on demand.  Encouraged latch assessment and patient to call with next feeding.  She has PBP.  LC number on WB, will follow as needed.

## 2024-05-15 NOTE — ANESTHESIA PREPROCEDURE EVALUATION
Anesthesia Evaluation     no history of anesthetic complications:                Airway   Mallampati: II  Dental - normal exam     Pulmonary - normal exam   Cardiovascular - normal exam        Neuro/Psych  GI/Hepatic/Renal/Endo      Musculoskeletal     Abdominal    Substance History      OB/GYN    (+) Pregnant    Comment: 38 wks 1 day      Other                    Anesthesia Plan    ASA 2     epidural       Anesthetic plan, risks, benefits, and alternatives have been provided, discussed and informed consent has been obtained with: patient and spouse/significant other (Translated by spouse).    CODE STATUS:    Level Of Support Discussed With: Patient  Code Status (Patient has no pulse and is not breathing): CPR (Attempt to Resuscitate)  Medical Interventions (Patient has pulse or is breathing): Full Support

## 2024-05-15 NOTE — L&D DELIVERY NOTE
[facility]   Vaginal Delivery Note    Patient Name: Nicho George  : 1995  MRN: [id number]    Date of Delivery: 5/15/2024     Diagnosis     Pre & Post-Delivery:  Intrauterine pregnancy at 38w1d  Labor status: Induced Onset of Labor     Pregnancy    Polyhydramnios in third trimester    Positive GBS test    Abnormal  test    Decreased fetal movements in third trimester    Vaginal delivery             Problem List    Transfer to Postpartum     Review the Delivery Report for details.     Delivery     Delivery: Vaginal, Spontaneous     YOB: 2022    Time of Birth:  Gestational Age 12:14 PM   38w1d     Anesthesia: Epidural     Delivering clinician: Mattie Prince    Forceps?   No   Vacuum? No    Shoulder dystocia present: No        Delivery narrative:  28 year old G 1 P 0 admitted for labor induction due to decreased fetal movement with BPP 4/8 and polyhydramnios. Fetal heart tones were reassuring on admit and remained overall reassuring during labor with episodes of Category II tracing. She received cervidil and this was removed after 12 hours. She had spontaneous rupture of membranes with clear fluid at 0335 this AM. She had epidural placed for pain control. She received penicillin for GBS prophylaxis. She progressed to second stage and delivered via spontaneous vaginal delivery after pushing with one contraction. The infant delivered OA. The shoulders and body delivered easily and she was placed on mother's chest for kangaroo care. The umbilical cord was clamped and cut by the father of the baby after 30 seconds. The placenta delivered spontaneously and appeared intact. Manual exploration of the uterus did not reveal retained products. First degree midline and left labial lacerations were repaired. All counts were correct following.       Infant     Findings: female  infant     Infant observations: Weight: No birth weight on file.   Length:    in  Observations/Comments:        Apgars:   @ 1 minute /      @ 5 minutes   Infant Name: Carolina     Placenta & Cord         Placenta delivered  Spontaneous  at   5/15/2024 12:20 PM     Cord: 3 vessels  present.   Nuchal Cord?  no   Cord blood obtained: Yes    Cord gases obtained:  Yes    Cord gas results: Venous:    pH, Cord Venous   Date Value Ref Range Status   05/15/2024 7.332 7.260 - 7.400 pH Units Final     Comment:     Serial Number: [id number]:  [id number]     Base Excess, Cord Venous   Date Value Ref Range Status   05/15/2024 -0.9 -30.0 - 30.0 mmol/L Final       Arterial:    pH, Cord Arterial   Date Value Ref Range Status   05/15/2024 7.30 7.18 - 7.34 pH Units Final     Comment:     Serial Number: [id number]:  [id number]     Base Exc, Cord Arterial   Date Value Ref Range Status   05/15/2024 -11.2 (L) -2.0 - 2.0 mmol/L Final        Repair     Episiotomy: None     No    Lacerations: Yes  Laceration Information  Laceration Repaired?   Perineal: 1st      Periurethral: left      Labial:       Sulcus:       Vaginal:       Cervical:         Suture used for repair: 3-0, 4-0 Vicryl  Laceration Length for 3rd or 4th degree lacerations: n/a    Estimated Blood Loss:  See QBL     Quantitative Blood Loss: Quantitative Blood Loss (mL): 163 mL (05/15/24 1230)        Complications     none    Disposition     Mother to Mother Baby/Postpartum  in stable condition currently.  Baby to NBN  in stable condition currently.    Mattie Prince MD  05/15/24  12:51 EDT

## 2024-05-15 NOTE — PROGRESS NOTES
"[facility]  Obstetric Progress Note    Subjective     Patient:    The patient feels comfortable after epidural.      Objective     Vital Signs Range for the last 24 hours  Temp:  [97.6 °F (36.4 °C)-99.6 °F (37.6 °C)] 98.2 °F (36.8 °C)   Temp src: Oral   BP: ()/(49-80) 106/71   Heart Rate:  [49-91] 58   Resp:  [14-18] 16   SpO2:  [95 %-100 %] 96 %           Weight:  [71.6 kg (157 lb 12.8 oz)] 71.6 kg (157 lb 12.8 oz)       Flowsheet Rows      Flowsheet Row First Filed Value   Admission Height 157.5 cm (62\") Documented at 05/14/2024 1214   Admission Weight --            Intake/Output last 24 hours:      Intake/Output Summary (Last 24 hours) at 5/15/2024 0850  Last data filed at 5/15/2024 0700  Gross per 24 hour   Intake 2376.72 ml   Output 625 ml   Net 1751.72 ml       Intake/Output this shift:    No intake/output data recorded.    Physical Exam:  General: Patient is comfortable and in no acute distress                         Cervix: Exam by: Method: sterile exam per RN (JAZMIN Pina RNC)   Dilation: Cervical Dilation (cm): 1-2   Effacement: Cervical Effacement: 60%   Station: Fetal Station: 1-->-2         Fetal Heart Rate Assessment   Method: Fetal HR Assessment Method: external   Beats/min: Fetal HR (beats/min): 135   Baseline: Fetal HR Baseline: normal range   Variability: Fetal HR Variability: minimal (detectable, amplitude less than or equal to 5 bpm)   Accels: Fetal HR Accelerations: absent   Decels: Fetal HR Decelerations: absent   Tracing Category: Fetal HR Tracing Category: Category II     Uterine Assessment   Method: Method: external tocotransducer, palpation   Frequency (min): Contraction Frequency (Minutes): 1-7.5   Ctx Count in 10 min: Contractions in 10 Minutes: 5   Duration:     Intensity: Contraction Intensity: moderate by palpation   Intensity by IUPC:     Resting Tone: Uterine Resting Tone: soft by palpation   Resting Tone by IUPC:     Rock Units:         Assessment & Plan       " Pregnancy    Polyhydramnios in third trimester    Positive GBS test    Abnormal  test    Decreased fetal movements in third trimester        Assessment:  1.  Intrauterine pregnancy at 38w1d gestation with overall reassuring fetal status.  Currently decreased variability is noted, accels and moderate variability noted recently.   2.  IOL for decreased fetal movement and BPP of 4/8 yesterday. Pt lso with polyhydramnios. Tracing overall reassuring with moderate variability and accelerations at times. Pt received cervidil and had SROM following removal of cervidil in early AM. She is receiving PCN for GBS prophylaxis.   3.  Obstetrical history significant for is non-contributory.  4.  GBS status:   Strep Gp B BETO   Date Value Ref Range Status   2024 Positive (A) Negative Final     Comment:     Centers for Disease Control and Prevention (CDC) and American Congress  of Obstetricians and Gynecologists (ACOG) guidelines for prevention of   group B streptococcal (GBS) disease specify co-collection of  a vaginal and rectal swab specimen to maximize sensitivity of GBS  detection. Per the CDC and ACOG, swabbing both the lower vagina and  rectum substantially increases the yield of detection compared with  sampling the vagina alone.  Penicillin G, ampicillin, or cefazolin are indicated for intrapartum  prophylaxis of  GBS colonization. Reflex susceptibility  testing should be performed prior to use of clindamycin only on GBS  isolates from penicillin-allergic women who are considered a high risk  for anaphylaxis. Treatment with vancomycin without additional testing  is warranted if resistance to clindamycin is noted.         Plan:  1. fetal and uterine monitoring  continuously, labor augmentation  Pitocin, analgesia with  epidural, and antibiotic for GBS  2. Plan of care has been reviewed with patient and partner  3.  Risks, benefits of treatment plan have been discussed.  4.  All questions have  been answered.      Mattie Prince MD  5/15/2024  08:50 EDT

## 2024-05-16 LAB
BASOPHILS # BLD AUTO: 0.02 10*3/MM3 (ref 0–0.2)
BASOPHILS NFR BLD AUTO: 0.2 % (ref 0–1.5)
DEPRECATED RDW RBC AUTO: 47.1 FL (ref 37–54)
EOSINOPHIL # BLD AUTO: 0.05 10*3/MM3 (ref 0–0.4)
EOSINOPHIL NFR BLD AUTO: 0.4 % (ref 0.3–6.2)
ERYTHROCYTE [DISTWIDTH] IN BLOOD BY AUTOMATED COUNT: 14.9 % (ref 12.3–15.4)
HCT VFR BLD AUTO: 37.7 % (ref 34–46.6)
HGB BLD-MCNC: 12.9 G/DL (ref 12–15.9)
IMM GRANULOCYTES # BLD AUTO: 0.07 10*3/MM3 (ref 0–0.05)
IMM GRANULOCYTES NFR BLD AUTO: 0.6 % (ref 0–0.5)
LYMPHOCYTES # BLD AUTO: 2.17 10*3/MM3 (ref 0.7–3.1)
LYMPHOCYTES NFR BLD AUTO: 18.4 % (ref 19.6–45.3)
MCH RBC QN AUTO: 30.1 PG (ref 26.6–33)
MCHC RBC AUTO-ENTMCNC: 34.2 G/DL (ref 31.5–35.7)
MCV RBC AUTO: 88.1 FL (ref 79–97)
MONOCYTES # BLD AUTO: 0.63 10*3/MM3 (ref 0.1–0.9)
MONOCYTES NFR BLD AUTO: 5.3 % (ref 5–12)
NEUTROPHILS NFR BLD AUTO: 75.1 % (ref 42.7–76)
NEUTROPHILS NFR BLD AUTO: 8.87 10*3/MM3 (ref 1.7–7)
NRBC BLD AUTO-RTO: 0 /100 WBC (ref 0–0.2)
PLATELET # BLD AUTO: 197 10*3/MM3 (ref 140–450)
PMV BLD AUTO: 11.3 FL (ref 6–12)
RBC # BLD AUTO: 4.28 10*6/MM3 (ref 3.77–5.28)
WBC NRBC COR # BLD AUTO: 11.81 10*3/MM3 (ref 3.4–10.8)

## 2024-05-16 PROCEDURE — 0503F POSTPARTUM CARE VISIT: CPT | Performed by: OBSTETRICS & GYNECOLOGY

## 2024-05-16 PROCEDURE — 85025 COMPLETE CBC W/AUTO DIFF WBC: CPT | Performed by: OBSTETRICS & GYNECOLOGY

## 2024-05-16 RX ADMIN — DOCUSATE SODIUM 100 MG: 100 CAPSULE, LIQUID FILLED ORAL at 09:03

## 2024-05-16 RX ADMIN — Medication 1 TABLET: at 09:04

## 2024-05-16 RX ADMIN — DOCUSATE SODIUM 100 MG: 100 CAPSULE, LIQUID FILLED ORAL at 20:28

## 2024-05-16 NOTE — PROGRESS NOTES
Postpartum  Day#1    Subjective:    Chief Complaint   Patient presents with    Scheduled Induction     Pt was sent off from the office for and induction for BPP 4/8     Pt doing well. Pain well controlled. Lochia normal. Ambulating well. Tolerating regular diet. Voiding without difficulty. No complaints    OB History    Para Term  AB Living   2 1 1   1 1   SAB IAB Ectopic Molar Multiple Live Births   1       0 1      # Outcome Date GA Lbr Ernesto/2nd Weight Sex Type Anes PTL Lv   2 Term 05/15/24 38w1d 08:26 / 00:13 2888 g (6 lb 5.9 oz) F Vag-Spont EPI N SHAKIR      Birth Comments: scale 2   1 2018 4w0d    SAB           Vitals:   Wt Readings from Last 3 Encounters:   24 71.6 kg (157 lb 12.8 oz)   24 68.3 kg (150 lb 9.6 oz)   24 67.9 kg (149 lb 12.8 oz)     Temp Readings from Last 3 Encounters:   24 97.7 °F (36.5 °C) (Oral)     BP Readings from Last 3 Encounters:   24 98/60   24 116/72   24 124/80     Pulse Readings from Last 3 Encounters:   24 73   ROS:      ROS:Pulm: neg for soa  GI: neg for heavy bleeding              Musculoskel: neg for leg pain        LABS:  Lab Results (last 24 hours)       Procedure Component Value Units Date/Time    CBC & Differential [[id number]]  (Abnormal) Collected: 24    Specimen: Blood Updated: 24    Narrative:      The following orders were created for panel order CBC & Differential.  Procedure                               Abnormality         Status                     ---------                               -----------         ------                     CBC Auto Differential[[id number]]        Abnormal            Final result                 Please view results for these tests on the individual orders.    CBC Auto Differential [[id number]]  (Abnormal) Collected: 24    Specimen: Blood Updated: 24     WBC 11.81 10*3/mm3      RBC 4.28 10*6/mm3      Hemoglobin 12.9  g/dL      Hematocrit 37.7 %      MCV 88.1 fL      MCH 30.1 pg      MCHC 34.2 g/dL      RDW 14.9 %      RDW-SD 47.1 fl      MPV 11.3 fL      Platelets 197 10*3/mm3      Neutrophil % 75.1 %      Lymphocyte % 18.4 %      Monocyte % 5.3 %      Eosinophil % 0.4 %      Basophil % 0.2 %      Immature Grans % 0.6 %      Neutrophils, Absolute 8.87 10*3/mm3      Lymphocytes, Absolute 2.17 10*3/mm3      Monocytes, Absolute 0.63 10*3/mm3      Eosinophils, Absolute 0.05 10*3/mm3      Basophils, Absolute 0.02 10*3/mm3      Immature Grans, Absolute 0.07 10*3/mm3      nRBC 0.0 /100 WBC     Blood Gas, Venous, Cord [[id number]]  (Abnormal) Collected: 05/15/24 1247    Specimen: Cord Blood Venous from Umbilical Cord Updated: 05/15/24 1250     Site --     Comment: N/A        pH, Cord Venous 7.332 pH Units      Comment: Serial Number: [id number]:  [id number]        pCO2, Cord Venous 48.6 mm Hg      pO2, Cord Venous 28.0 mm Hg      HCO3, Cord Venous 25.8 mmol/L      Base Excess, Cord Venous -0.9 mmol/L      O2 Sat, Cord Venous --     Comment: Direct O2 saturation result not reported at this site.        CO2 Content 27.3 mmol/L      Barometric Pressure for Blood Gas 743.5000 mmHg      Modality Room Air    Blood Gas, Arterial, Cord [[id number]]  (Abnormal) Collected: 05/15/24 1238    Specimen: Cord Blood Arterial from Umbilical Cord Updated: 05/15/24 1244     Site --     Comment: N/A        pH, Cord Arterial 7.30 pH Units      Comment: Serial Number: [id number]:  [id number]        pCO2, Cord Arterial 29.0 mmHg      pO2, Cord Arterial 152.0 mmHg      HCO3, Cord Arterial 14.3 mmol/L      Base Exc, Cord Arterial -11.2 mmol/L      O2 Sat, Cord Arterial 99.2 %      CO2 Content 15.2 mmol/L      Barometric Pressure for Blood Gas 741.9000 mmHg      Modality Room Air     Notified Who JENNY Pina            Exam:   Gen: NAD, cooperative, conversive  Abd: Soft, nondistended, fundus is firm below umbilicus, approp tender  Ext: Nontender bilaterally  Lochia  normal        Principal Problem:    Pregnancy  Active Problems:    Polyhydramnios in third trimester    Positive GBS test    Abnormal  test    Decreased fetal movements in third trimester    Vaginal delivery       Assessment::   Nicho George is a 28 y.o. female  s/p Vaginal, Spontaneous       1. PPD#1 , Doing well,   2. Precautions given  3. Routine post partum  care discussed  4. Ambulation encouraged.         cuauhtemoc Ramirez MD     May 16, 2024 09:08 EDT

## 2024-05-16 NOTE — LACTATION NOTE
This note was copied from a baby's chart.  P1, T baby has been very sleepy today. Reviewed waking techniques and attempted latching with a 24 mm NS. Baby would not engage so HP given to mom with instructions on use and cleaning. Will return to help syringe feed. FOB translates for mom. LC number on board.

## 2024-05-16 NOTE — LACTATION NOTE
This note was copied from a baby's chart.  P1 term baby, 22hrs old. Baby has been sleepy and Mom has been pumping with hand pump giving baby all EBM. She has pumped up to 3mls. Baby has breast fed x1. Attempted latch and syringe feeding baby but baby is too sleepy and will not suckle. Discussed pumping every 2-3hrs feeding baby all EBM and possible supplementation if baby continues with sleepiness.

## 2024-05-16 NOTE — PLAN OF CARE
Problem: Adult Inpatient Plan of Care  Goal: Plan of Care Review  Outcome: Ongoing, Progressing  Flowsheets (Taken 5/16/2024 0558 by Nato Gabriel RN)  Progress: improving  Plan of Care Reviewed With:   patient   spouse  Outcome Evaluation: VSS, assessment WNL, up ad janey, no complaints of pain, no concerns at this time.  Goal: Patient-Specific Goal (Individualized)  Outcome: Ongoing, Progressing  Goal: Absence of Hospital-Acquired Illness or Injury  Outcome: Ongoing, Progressing  Intervention: Identify and Manage Fall Risk  Recent Flowsheet Documentation  Taken 5/16/2024 1400 by Harika Renner RN  Safety Promotion/Fall Prevention: safety round/check completed  Taken 5/16/2024 1200 by Harika Renner RN  Safety Promotion/Fall Prevention: safety round/check completed  Taken 5/16/2024 1000 by Harika Renner RN  Safety Promotion/Fall Prevention: safety round/check completed  Taken 5/16/2024 0900 by Harika Renner RN  Safety Promotion/Fall Prevention: safety round/check completed  Taken 5/16/2024 0815 by Harika Renner RN  Safety Promotion/Fall Prevention: safety round/check completed  Intervention: Prevent Skin Injury  Recent Flowsheet Documentation  Taken 5/16/2024 0900 by Harika Renner RN  Body Position: sitting up in bed  Intervention: Prevent and Manage VTE (Venous Thromboembolism) Risk  Recent Flowsheet Documentation  Taken 5/16/2024 0900 by Harika Renner RN  Activity Management: up ad janey  Intervention: Prevent Infection  Recent Flowsheet Documentation  Taken 5/16/2024 0900 by Harika Renner RN  Infection Prevention: hand hygiene promoted  Goal: Optimal Comfort and Wellbeing  Outcome: Ongoing, Progressing  Intervention: Provide Person-Centered Care  Recent Flowsheet Documentation  Taken 5/16/2024 0900 by Harika Renner RN  Trust Relationship/Rapport:   care explained   choices provided   questions answered  Goal: Readiness for Transition of Care  Outcome: Ongoing, Progressing     Problem:  Bleeding (Labor)  Goal: Hemostasis  Outcome: Ongoing, Progressing     Problem: Change in Fetal Wellbeing (Labor)  Goal: Stable Fetal Wellbeing  Outcome: Ongoing, Progressing  Intervention: Promote and Monitor Fetal Wellbeing  Recent Flowsheet Documentation  Taken 5/16/2024 0900 by Harika Renner RN  Body Position: sitting up in bed     Problem: Delayed Labor Progression (Labor)  Goal: Effective Progression to Delivery  Outcome: Ongoing, Progressing     Problem: Infection (Labor)  Goal: Absence of Infection Signs and Symptoms  Outcome: Ongoing, Progressing  Intervention: Prevent or Manage Infection  Recent Flowsheet Documentation  Taken 5/16/2024 0900 by Harika Renner RN  Infection Prevention: hand hygiene promoted     Problem: Labor Pain (Labor)  Goal: Acceptable Pain Control  Outcome: Ongoing, Progressing     Problem: Uterine Tachysystole (Labor)  Goal: Normal Uterine Contraction Pattern  Outcome: Ongoing, Progressing     Problem: Skin Injury Risk Increased  Goal: Skin Health and Integrity  Outcome: Ongoing, Progressing  Intervention: Optimize Skin Protection  Recent Flowsheet Documentation  Taken 5/16/2024 0900 by Harika Renner RN  Head of Bed (HOB) Positioning: HOB elevated     Problem: Device-Related Complication Risk (Anesthesia/Analgesia, Neuraxial)  Goal: Safe Infusion Delivery Completion  Outcome: Ongoing, Progressing     Problem: Infection (Anesthesia/Analgesia, Neuraxial)  Goal: Absence of Infection Signs and Symptoms  Outcome: Ongoing, Progressing  Intervention: Prevent or Manage Infection  Recent Flowsheet Documentation  Taken 5/16/2024 0900 by Harika Renner RN  Infection Prevention: hand hygiene promoted     Problem: Nausea and Vomiting (Anesthesia/Analgesia, Neuraxial)  Goal: Nausea and Vomiting Relief  Outcome: Ongoing, Progressing     Problem: Pain (Anesthesia/Analgesia, Neuraxial)  Goal: Effective Pain Control  Outcome: Ongoing, Progressing     Problem: Respiratory Compromise  (Anesthesia/Analgesia, Neuraxial)  Goal: Effective Oxygenation and Ventilation  Outcome: Ongoing, Progressing  Intervention: Optimize Oxygenation and Ventilation  Recent Flowsheet Documentation  Taken 2024 0900 by Harika Renner RN  Head of Bed (HOB) Positioning: HOB elevated     Problem: Sensorimotor Impairment (Anesthesia/Analgesia, Neuraxial)  Goal: Baseline Motor Function  Outcome: Ongoing, Progressing  Intervention: Optimize Sensorimotor Function  Recent Flowsheet Documentation  Taken 2024 1400 by Harika Renner RN  Safety Promotion/Fall Prevention: safety round/check completed  Taken 2024 1200 by Harika Renner RN  Safety Promotion/Fall Prevention: safety round/check completed  Taken 2024 1000 by Harika Renner RN  Safety Promotion/Fall Prevention: safety round/check completed  Taken 2024 0900 by Harika Renner RN  Safety Promotion/Fall Prevention: safety round/check completed  Taken 2024 0815 by Harika Renner RN  Safety Promotion/Fall Prevention: safety round/check completed     Problem: Urinary Retention (Anesthesia/Analgesia, Neuraxial)  Goal: Effective Urinary Elimination  Outcome: Ongoing, Progressing     Problem:  Fall Injury Risk  Goal: Absence of Fall, Infant Drop and Related Injury  Outcome: Ongoing, Progressing  Intervention: Identify and Manage Contributors  Recent Flowsheet Documentation  Taken 2024 0900 by Harika Renner RN  Medication Review/Management: medications reviewed  Intervention: Promote Injury-Free Environment  Recent Flowsheet Documentation  Taken 2024 1400 by Harika Renner RN  Safety Promotion/Fall Prevention: safety round/check completed  Taken 2024 1200 by Harika Renner RN  Safety Promotion/Fall Prevention: safety round/check completed  Taken 2024 1000 by Harika Renner RN  Safety Promotion/Fall Prevention: safety round/check completed  Taken 2024 0900 by Harika Renner RN  Safety Promotion/Fall  Prevention: safety round/check completed  Taken 5/16/2024 0815 by Harika Renner RN  Safety Promotion/Fall Prevention: safety round/check completed     Problem: Adjustment to Role Transition (Postpartum Vaginal Delivery)  Goal: Successful Maternal Role Transition  Outcome: Ongoing, Progressing     Problem: Bleeding (Postpartum Vaginal Delivery)  Goal: Hemostasis  Outcome: Ongoing, Progressing     Problem: Infection (Postpartum Vaginal Delivery)  Goal: Absence of Infection Signs/Symptoms  Outcome: Ongoing, Progressing  Intervention: Prevent or Manage Infection  Recent Flowsheet Documentation  Taken 5/16/2024 0900 by Harika Renner RN  Perineal Care:   absorbent brief/pad changed   perineum cleansed     Problem: Pain (Postpartum Vaginal Delivery)  Goal: Acceptable Pain Control  Outcome: Ongoing, Progressing     Problem: Urinary Retention (Postpartum Vaginal Delivery)  Goal: Effective Urinary Elimination  Outcome: Ongoing, Progressing   Goal Outcome Evaluation:

## 2024-05-16 NOTE — PLAN OF CARE
Problem: Adult Inpatient Plan of Care  Goal: Plan of Care Review  Outcome: Ongoing, Progressing  Flowsheets (Taken 5/16/2024 0558)  Progress: improving  Plan of Care Reviewed With:   patient   spouse  Outcome Evaluation: VSS, assessment WNL, up ad janey, no complaints of pain, no concerns at this time.  Goal: Patient-Specific Goal (Individualized)  Outcome: Ongoing, Progressing  Goal: Absence of Hospital-Acquired Illness or Injury  Outcome: Ongoing, Progressing  Intervention: Identify and Manage Fall Risk  Recent Flowsheet Documentation  Taken 5/16/2024 0430 by Nato Gabriel RN  Safety Promotion/Fall Prevention: safety round/check completed  Taken 5/16/2024 0204 by Nato Gabriel RN  Safety Promotion/Fall Prevention: safety round/check completed  Taken 5/16/2024 0000 by Nato Gabriel RN  Safety Promotion/Fall Prevention: safety round/check completed  Taken 5/15/2024 2210 by Nato Gabriel RN  Safety Promotion/Fall Prevention: safety round/check completed  Taken 5/15/2024 2000 by Nato Gabriel RN  Safety Promotion/Fall Prevention: safety round/check completed  Goal: Optimal Comfort and Wellbeing  Outcome: Ongoing, Progressing  Intervention: Provide Person-Centered Care  Recent Flowsheet Documentation  Taken 5/15/2024 2000 by Nato Gabriel RN  Trust Relationship/Rapport:   care explained   choices provided   thoughts/feelings acknowledged   reassurance provided   questions encouraged   questions answered  Goal: Readiness for Transition of Care  Outcome: Ongoing, Progressing     Problem: Bleeding (Labor)  Goal: Hemostasis  Outcome: Ongoing, Progressing     Problem: Change in Fetal Wellbeing (Labor)  Goal: Stable Fetal Wellbeing  Outcome: Ongoing, Progressing     Problem: Delayed Labor Progression (Labor)  Goal: Effective Progression to Delivery  Outcome: Ongoing, Progressing     Problem: Infection (Labor)  Goal: Absence of Infection Signs and Symptoms  Outcome: Ongoing, Progressing     Problem:  Labor Pain (Labor)  Goal: Acceptable Pain Control  Outcome: Ongoing, Progressing     Problem: Uterine Tachysystole (Labor)  Goal: Normal Uterine Contraction Pattern  Outcome: Ongoing, Progressing     Problem: Skin Injury Risk Increased  Goal: Skin Health and Integrity  Outcome: Ongoing, Progressing     Problem: Device-Related Complication Risk (Anesthesia/Analgesia, Neuraxial)  Goal: Safe Infusion Delivery Completion  Outcome: Ongoing, Progressing     Problem: Infection (Anesthesia/Analgesia, Neuraxial)  Goal: Absence of Infection Signs and Symptoms  Outcome: Ongoing, Progressing     Problem: Nausea and Vomiting (Anesthesia/Analgesia, Neuraxial)  Goal: Nausea and Vomiting Relief  Outcome: Ongoing, Progressing     Problem: Pain (Anesthesia/Analgesia, Neuraxial)  Goal: Effective Pain Control  Outcome: Ongoing, Progressing  Intervention: Prevent or Manage Pain  Recent Flowsheet Documentation  Taken 5/15/2024 2000 by Nato Gabriel RN  Diversional Activities: smartphone     Problem: Respiratory Compromise (Anesthesia/Analgesia, Neuraxial)  Goal: Effective Oxygenation and Ventilation  Outcome: Ongoing, Progressing     Problem: Sensorimotor Impairment (Anesthesia/Analgesia, Neuraxial)  Goal: Baseline Motor Function  Outcome: Ongoing, Progressing  Intervention: Optimize Sensorimotor Function  Recent Flowsheet Documentation  Taken 5/16/2024 0430 by Nato Gabriel RN  Safety Promotion/Fall Prevention: safety round/check completed  Taken 5/16/2024 0204 by Nato Gabriel RN  Safety Promotion/Fall Prevention: safety round/check completed  Taken 5/16/2024 0000 by Nato Gabriel RN  Safety Promotion/Fall Prevention: safety round/check completed  Taken 5/15/2024 2210 by Nato Gabriel RN  Safety Promotion/Fall Prevention: safety round/check completed  Taken 5/15/2024 2000 by Nato Gabriel RN  Safety Promotion/Fall Prevention: safety round/check completed     Problem: Urinary Retention (Anesthesia/Analgesia,  Neuraxial)  Goal: Effective Urinary Elimination  Outcome: Ongoing, Progressing     Problem:  Fall Injury Risk  Goal: Absence of Fall, Infant Drop and Related Injury  Outcome: Ongoing, Progressing  Intervention: Promote Injury-Free Environment  Recent Flowsheet Documentation  Taken 2024 0430 by Nato Gabriel RN  Safety Promotion/Fall Prevention: safety round/check completed  Taken 2024 0204 by Nato Gabriel RN  Safety Promotion/Fall Prevention: safety round/check completed  Taken 2024 0000 by Nato Gabriel RN  Safety Promotion/Fall Prevention: safety round/check completed  Taken 5/15/2024 2210 by Nato Gabriel RN  Safety Promotion/Fall Prevention: safety round/check completed  Taken 5/15/2024 2000 by Nato Gabriel RN  Safety Promotion/Fall Prevention: safety round/check completed     Problem: Adjustment to Role Transition (Postpartum Vaginal Delivery)  Goal: Successful Maternal Role Transition  Outcome: Ongoing, Progressing     Problem: Bleeding (Postpartum Vaginal Delivery)  Goal: Hemostasis  Outcome: Ongoing, Progressing     Problem: Infection (Postpartum Vaginal Delivery)  Goal: Absence of Infection Signs/Symptoms  Outcome: Ongoing, Progressing     Problem: Pain (Postpartum Vaginal Delivery)  Goal: Acceptable Pain Control  Outcome: Ongoing, Progressing     Problem: Urinary Retention (Postpartum Vaginal Delivery)  Goal: Effective Urinary Elimination  Outcome: Ongoing, Progressing   Goal Outcome Evaluation:  Plan of Care Reviewed With: patient, spouse        Progress: improving  Outcome Evaluation: VSS, assessment WNL, up ad janey, no complaints of pain, no concerns at this time.

## 2024-05-16 NOTE — LACTATION NOTE
Syringe/finger fed baby 1 ml of ebm. Baby began showing feeding cues and was able to latch in FB hold using a 24 mm NS. She does unlatch easily so educated parents on how to keep her engaged and for mom to hold her closer. When in proper position baby has a deep latch with flanged lips and good jaw rotation. Mom reports tugging and no pain. Reviewed using HP or HE to give baby a small amount of ebm before feeds.      Reviewed bf education: STS, suck training, stimulation  Attempt feeding every 3 hours and when baby is alert, feeding cues present.   Normal  behavior including sleepiness- HE and give ebm if no latch achieved.  Proper way to position and steps for an effective latch, NS placement and cleaning, check nipple shape after feeds.   Weight and output expectations.  Infant stomach size  Full milk supply day 3-5.  Nipple care.  Review Postpartum handbook pages 35-45, [facility] information.  Mom has a PBP. Lanolin given with instructions.  Call LC for assistance. # on WB.

## 2024-05-16 NOTE — LACTATION NOTE
Pt wanting assistance with latching baby. LC assisted pt for about 45 min with latching baby on both breasts. Baby sucked for about 5 min total with and without nipple shield. Baby is very sleepy. FOB assisted pt with pumping 1.5 cc of colostrum that was given to baby per syringe. Baby tolerated well. Encouraged to BF again within 2 hours of last feeding. Encouraged to cont pumping with each feeding and supplement all colostrum to baby.     Lactation Consult Note    Evaluation Completed: 2024 17:07 EDT  Patient Name: Nicho George  :  1995  MRN:  [id number]     REFERRAL  INFORMATION:                          Date of Referral: 24   Person Making Referral: nurse  Maternal Reason for Referral: latch difficulty  Infant Reason for Referral: sleepy    DELIVERY HISTORY:        Skin to skin initiation date/time: 5/15/2024  12:16 PM   Skin to skin end date/time: 5/15/2024  1:45 PM        MATERNAL ASSESSMENT:                               INFANT ASSESSMENT:  Information for the patient's :  Maura Peterson [[id number]]   No past medical history on file.                                                                                                   MATERNAL INFANT FEEDING:                                                                       EQUIPMENT TYPE:                                 BREAST PUMPING:          LACTATION REFERRALS:

## 2024-05-17 VITALS
SYSTOLIC BLOOD PRESSURE: 97 MMHG | OXYGEN SATURATION: 97 % | DIASTOLIC BLOOD PRESSURE: 59 MMHG | TEMPERATURE: 97.8 F | BODY MASS INDEX: 28.86 KG/M2 | RESPIRATION RATE: 16 BRPM | HEART RATE: 67 BPM | HEIGHT: 62 IN

## 2024-05-17 PROBLEM — O99.019 MATERNAL ANEMIA IN PREGNANCY, ANTEPARTUM: Status: RESOLVED | Noted: 2024-03-19 | Resolved: 2024-05-17

## 2024-05-17 PROCEDURE — 0503F POSTPARTUM CARE VISIT: CPT | Performed by: STUDENT IN AN ORGANIZED HEALTH CARE EDUCATION/TRAINING PROGRAM

## 2024-05-17 RX ORDER — ACETAMINOPHEN 500 MG
1000 TABLET ORAL EVERY 6 HOURS PRN
Qty: 60 TABLET | Refills: 0 | Status: SHIPPED | OUTPATIENT
Start: 2024-05-17

## 2024-05-17 RX ORDER — AMOXICILLIN 250 MG
1 CAPSULE ORAL DAILY
Qty: 60 TABLET | Refills: 0 | Status: SHIPPED | OUTPATIENT
Start: 2024-05-17

## 2024-05-17 RX ORDER — IBUPROFEN 800 MG/1
800 TABLET ORAL EVERY 8 HOURS PRN
Qty: 30 TABLET | Refills: 0 | Status: SHIPPED | OUTPATIENT
Start: 2024-05-17

## 2024-05-17 RX ADMIN — DOCUSATE SODIUM 100 MG: 100 CAPSULE, LIQUID FILLED ORAL at 08:11

## 2024-05-17 RX ADMIN — Medication 1 TABLET: at 08:11

## 2024-05-17 NOTE — PLAN OF CARE
Problem: Adult Inpatient Plan of Care  Goal: Plan of Care Review  Outcome: Ongoing, Progressing  Flowsheets (Taken 5/17/2024 0423)  Progress: improving  Plan of Care Reviewed With:   patient   spouse  Outcome Evaluation: VSS, assessment WNL, up ad janey, voiding, no complaints of pain.  Goal: Patient-Specific Goal (Individualized)  Outcome: Ongoing, Progressing  Goal: Absence of Hospital-Acquired Illness or Injury  Outcome: Ongoing, Progressing  Intervention: Identify and Manage Fall Risk  Recent Flowsheet Documentation  Taken 5/17/2024 0415 by Nato Gabriel RN  Safety Promotion/Fall Prevention: safety round/check completed  Taken 5/17/2024 0235 by Nato Gabriel RN  Safety Promotion/Fall Prevention: safety round/check completed  Taken 5/17/2024 0005 by Nato Gabriel RN  Safety Promotion/Fall Prevention: safety round/check completed  Taken 5/16/2024 2204 by Nato Gabriel RN  Safety Promotion/Fall Prevention: safety round/check completed  Taken 5/16/2024 2025 by Nato Gabriel RN  Safety Promotion/Fall Prevention: safety round/check completed  Intervention: Prevent Skin Injury  Recent Flowsheet Documentation  Taken 5/16/2024 2025 by Nato Gabriel RN  Body Position: position changed independently  Intervention: Prevent and Manage VTE (Venous Thromboembolism) Risk  Recent Flowsheet Documentation  Taken 5/16/2024 2025 by Nato Gabriel RN  Activity Management: up ad janey  Goal: Optimal Comfort and Wellbeing  Outcome: Ongoing, Progressing  Intervention: Provide Person-Centered Care  Recent Flowsheet Documentation  Taken 5/16/2024 2025 by Nato Gabriel RN  Trust Relationship/Rapport:   care explained   choices provided   thoughts/feelings acknowledged   reassurance provided   questions encouraged   questions answered  Goal: Readiness for Transition of Care  Outcome: Ongoing, Progressing     Problem: Bleeding (Labor)  Goal: Hemostasis  Outcome: Ongoing, Progressing     Problem: Change in Fetal  Wellbeing (Labor)  Goal: Stable Fetal Wellbeing  Outcome: Ongoing, Progressing  Intervention: Promote and Monitor Fetal Wellbeing  Recent Flowsheet Documentation  Taken 5/16/2024 2025 by Nato Gabriel RN  Body Position: position changed independently     Problem: Delayed Labor Progression (Labor)  Goal: Effective Progression to Delivery  Outcome: Ongoing, Progressing     Problem: Infection (Labor)  Goal: Absence of Infection Signs and Symptoms  Outcome: Ongoing, Progressing     Problem: Labor Pain (Labor)  Goal: Acceptable Pain Control  Outcome: Ongoing, Progressing     Problem: Uterine Tachysystole (Labor)  Goal: Normal Uterine Contraction Pattern  Outcome: Ongoing, Progressing     Problem: Skin Injury Risk Increased  Goal: Skin Health and Integrity  Outcome: Ongoing, Progressing  Intervention: Optimize Skin Protection  Recent Flowsheet Documentation  Taken 5/16/2024 2025 by Nato Gabriel RN  Head of Bed (HOB) Positioning: HOB elevated     Problem: Device-Related Complication Risk (Anesthesia/Analgesia, Neuraxial)  Goal: Safe Infusion Delivery Completion  Outcome: Ongoing, Progressing     Problem: Infection (Anesthesia/Analgesia, Neuraxial)  Goal: Absence of Infection Signs and Symptoms  Outcome: Ongoing, Progressing     Problem: Nausea and Vomiting (Anesthesia/Analgesia, Neuraxial)  Goal: Nausea and Vomiting Relief  Outcome: Ongoing, Progressing     Problem: Pain (Anesthesia/Analgesia, Neuraxial)  Goal: Effective Pain Control  Outcome: Ongoing, Progressing  Intervention: Prevent or Manage Pain  Recent Flowsheet Documentation  Taken 5/16/2024 2025 by Nato Gabriel RN  Diversional Activities: smartphone     Problem: Respiratory Compromise (Anesthesia/Analgesia, Neuraxial)  Goal: Effective Oxygenation and Ventilation  Outcome: Ongoing, Progressing  Intervention: Optimize Oxygenation and Ventilation  Recent Flowsheet Documentation  Taken 5/16/2024 2025 by Nato Gabriel RN  Head of Bed (HOB)  Positioning: HOB elevated     Problem: Sensorimotor Impairment (Anesthesia/Analgesia, Neuraxial)  Goal: Baseline Motor Function  Outcome: Ongoing, Progressing  Intervention: Optimize Sensorimotor Function  Recent Flowsheet Documentation  Taken 2024 041 by Nato Gabriel RN  Safety Promotion/Fall Prevention: safety round/check completed  Taken 2024 0235 by Nato Gabriel RN  Safety Promotion/Fall Prevention: safety round/check completed  Taken 2024 0005 by Nato Gabriel RN  Safety Promotion/Fall Prevention: safety round/check completed  Taken 2024 by Nato Gabriel RN  Safety Promotion/Fall Prevention: safety round/check completed  Taken 2024 by Nato Gabriel RN  Safety Promotion/Fall Prevention: safety round/check completed     Problem: Urinary Retention (Anesthesia/Analgesia, Neuraxial)  Goal: Effective Urinary Elimination  Outcome: Ongoing, Progressing     Problem:  Fall Injury Risk  Goal: Absence of Fall, Infant Drop and Related Injury  Outcome: Ongoing, Progressing  Intervention: Promote Injury-Free Environment  Recent Flowsheet Documentation  Taken 2024 041 by Nato Gabriel RN  Safety Promotion/Fall Prevention: safety round/check completed  Taken 2024 023 by Nato Gabriel RN  Safety Promotion/Fall Prevention: safety round/check completed  Taken 2024 0005 by Nato Gabriel RN  Safety Promotion/Fall Prevention: safety round/check completed  Taken 2024 by Nato Gabriel RN  Safety Promotion/Fall Prevention: safety round/check completed  Taken 2024 by Nato Gabriel RN  Safety Promotion/Fall Prevention: safety round/check completed     Problem: Adjustment to Role Transition (Postpartum Vaginal Delivery)  Goal: Successful Maternal Role Transition  Outcome: Ongoing, Progressing     Problem: Bleeding (Postpartum Vaginal Delivery)  Goal: Hemostasis  Outcome: Ongoing, Progressing     Problem: Infection  (Postpartum Vaginal Delivery)  Goal: Absence of Infection Signs/Symptoms  Outcome: Ongoing, Progressing  Intervention: Prevent or Manage Infection  Recent Flowsheet Documentation  Taken 5/16/2024 2025 by Nato Gabriel, RN  Perineal Care:   absorbent brief/pad changed   perineum cleansed   perineal spray bottle/warm water use encouraged   perineal hygiene encouraged     Problem: Pain (Postpartum Vaginal Delivery)  Goal: Acceptable Pain Control  Outcome: Ongoing, Progressing     Problem: Urinary Retention (Postpartum Vaginal Delivery)  Goal: Effective Urinary Elimination  Outcome: Ongoing, Progressing   Goal Outcome Evaluation:  Plan of Care Reviewed With: patient, spouse        Progress: improving  Outcome Evaluation: VSS, assessment WNL, up ad janey, voiding, no complaints of pain.

## 2024-05-17 NOTE — LACTATION NOTE
This note was copied from a baby's chart.  Baby is at 10% weight loss. Mom continues to breast feed and pump getting a few drops then baby is supplemented with 10-15mls of formula. Discussed with parents: milk production, encouraged pumping every 3hrs,  Mom has Lansinoh wearable pump, encouraged electric pump such as Spectra and discussed OPLC.

## 2024-05-17 NOTE — DISCHARGE SUMMARY
[facility]  Delivery Discharge Summary    Primary OB Clinician: Marion Langston MD    Admission Diagnosis:  Principal Problem:    Pregnancy  Active Problems:    Polyhydramnios in third trimester    Positive GBS test    Abnormal  test    Decreased fetal movements in third trimester    Vaginal delivery      Discharge Diagnosis:  Same     Gestational Age: 38w1d    Date of Delivery: 5/15/2024     Delivery Type: Vaginal, Spontaneous      Intrapartum Course: See delivery note for details.    Postpartum Course:  Uncomplicated pp course. Pt is tolerating po well, ambulating and voiding without difficulty.  Pain is well controlled. Bleeding is minimal/ appropriate for pp state.     Physical Exam:    Vitals:   Vitals:    24 0724 24 1551 24 2230 24 0745   BP: 98/60 101/66 97/59 97/59   BP Location: Right arm Right arm Right arm Right arm   Patient Position: Lying Sitting Sitting Lying   Pulse: 73 83 76 67   Resp:    Temp: 97.7 °F (36.5 °C) 98 °F (36.7 °C) 98.3 °F (36.8 °C) 97.8 °F (36.6 °C)   TempSrc: Oral Oral Oral Oral   SpO2:       Height:         Temp (24hrs), Av °F (36.7 °C), Min:97.8 °F (36.6 °C), Max:98.3 °F (36.8 °C)      General Appearance:    Alert, cooperative, in no acute distress   Abdomen:    Fundus firm below umbilicus, nontender and benign non-tender, without masses or organomegaly palpable           Extremities: Moves all extremities well, No edema , no cyanosis, no redness.     Labs:   Results from last 7 days   Lab Units 24  0600 24  1157   WBC 10*3/mm3 11.81* 9.62   HEMOGLOBIN g/dL 12.9 14.3   HEMATOCRIT % 37.7 42.8   PLATELETS 10*3/mm3 197 240     Results from last 7 days   Lab Units 24  1157   GLUCOSE mg/dL 72         Discharge Medications:     Discharge Medications        New Medications        Instructions Start Date   acetaminophen 500 MG tablet  Commonly known as: TYLENOL   1,000 mg, Oral, Every 6 Hours PRN      ibuprofen  800 MG tablet  Commonly known as: ADVIL,MOTRIN   800 mg, Oral, Every 8 Hours PRN      sennosides-docusate 8.6-50 MG per tablet  Commonly known as: PERICOLACE   1 tablet, Oral, Daily             Continue These Medications        Instructions Start Date   ferrous sulfate 325 (65 FE) MG tablet   325 mg, Oral, Daily With Breakfast      PRENATAL VITAMIN PO   Oral               Feeding method: Breastfeeding Status: Yes    Blood Type: RH Positive      Plan:  Discharge to home.    Follow-up appointment with  Mattie Prince MD  in 6 weeks.  All discharge instructions were reviewed with pt including bleeding warnings, s/sx of pp depression, and warning signs in the pp period for which to seek medical attention including but not limited to s/sx of hypertension and thromboembolism.

## 2024-05-31 ENCOUNTER — TELEPHONE (OUTPATIENT)
Dept: OBSTETRICS AND GYNECOLOGY | Age: 29
End: 2024-05-31
Payer: COMMERCIAL

## 2024-05-31 ENCOUNTER — POSTPARTUM VISIT (OUTPATIENT)
Dept: OBSTETRICS AND GYNECOLOGY | Age: 29
End: 2024-05-31
Payer: COMMERCIAL

## 2024-05-31 VITALS
HEIGHT: 62 IN | BODY MASS INDEX: 24.66 KG/M2 | SYSTOLIC BLOOD PRESSURE: 100 MMHG | TEMPERATURE: 103.4 F | WEIGHT: 134 LBS | DIASTOLIC BLOOD PRESSURE: 62 MMHG

## 2024-05-31 DIAGNOSIS — Z78.9 BREASTFEEDING (INFANT): ICD-10-CM

## 2024-05-31 DIAGNOSIS — N61.0 MASTITIS, LEFT, ACUTE: ICD-10-CM

## 2024-05-31 DIAGNOSIS — R52 BODY ACHES: ICD-10-CM

## 2024-05-31 DIAGNOSIS — R11.0 NAUSEA WITHOUT VOMITING: ICD-10-CM

## 2024-05-31 DIAGNOSIS — R50.9 FEVER, UNSPECIFIED FEVER CAUSE: ICD-10-CM

## 2024-05-31 DIAGNOSIS — N61.0 ACUTE MASTITIS: ICD-10-CM

## 2024-05-31 DIAGNOSIS — N89.8 VAGINAL DISCHARGE: ICD-10-CM

## 2024-05-31 DIAGNOSIS — R68.83 CHILLS: ICD-10-CM

## 2024-05-31 PROBLEM — O28.9 ABNORMAL ANTENATAL TEST: Status: RESOLVED | Noted: 2024-05-14 | Resolved: 2024-05-31

## 2024-05-31 PROBLEM — B95.1 POSITIVE GBS TEST: Status: RESOLVED | Noted: 2024-05-06 | Resolved: 2024-05-31

## 2024-05-31 PROBLEM — Z34.90 PREGNANCY: Status: RESOLVED | Noted: 2024-05-14 | Resolved: 2024-05-31

## 2024-05-31 PROBLEM — O36.8130 DECREASED FETAL MOVEMENTS IN THIRD TRIMESTER: Status: RESOLVED | Noted: 2024-05-14 | Resolved: 2024-05-31

## 2024-05-31 PROBLEM — O40.3XX0 POLYHYDRAMNIOS IN THIRD TRIMESTER: Status: RESOLVED | Noted: 2024-05-01 | Resolved: 2024-05-31

## 2024-05-31 LAB
BILIRUB BLD-MCNC: NEGATIVE MG/DL
CLARITY, POC: CLEAR
COLOR UR: YELLOW
GLUCOSE UR STRIP-MCNC: NEGATIVE MG/DL
KETONES UR QL: ABNORMAL
LEUKOCYTE EST, POC: ABNORMAL
NITRITE UR-MCNC: NEGATIVE MG/ML
PH UR: 7 [PH] (ref 5–8)
PROT UR STRIP-MCNC: ABNORMAL MG/DL
RBC # UR STRIP: ABNORMAL /UL
SP GR UR: 1.01 (ref 1–1.03)
UROBILINOGEN UR QL: NORMAL

## 2024-05-31 NOTE — PROGRESS NOTES
"Subjective     Chief Complaint   Patient presents with    Postpartum Care     Vaginal delivery 5/15/2024, breastfeeding, Pt has had a fever since yesterday morning with some nausea on and off and nothing is helping take it away       iNcho George is a 29 y.o.  whose LMP is Patient's last menstrual period was 2023. presents with concerns   Her  is with her today and is translating   Reports yesterday morning started feeling bad, nausea and had a temperature Fever 100.2 yesterday  Tylenol yesterday helped a little   Body aches  Chills   Some yellow vaginal discharge no odor noticed a few days ago  Has not taken any other antipyretics since  Breastfeeding some tenderness no severe pain  No urinary sx  No respiratory sx      The following portions of the patient's history were reviewed and updated as appropriate:vital signs, allergies, current medications, past medical history, past social history, past surgical history, and problem list      Review of Systems   Pertinent items are noted in HPI.     Objective      /62   Temp (!) 103.4 °F (39.7 °C) (Oral)   Ht 157.5 cm (62\")   Wt 60.8 kg (134 lb)   LMP 2023   Breastfeeding Yes   BMI 24.51 kg/m²     Physical Exam  Constitutional:       Appearance: She is ill-appearing.   Chest:   Breasts:     Right: Tenderness present.      Left: Skin change and tenderness present.          Comments: Mild erythema noted to left breast    Genitourinary:     Cervix: No cervical motion tenderness or erythema.      Uterus: Not tender.       Comments: Normal PP exam no tenderness with bimanual no CMT        General:   fatigued and pale   Heart: regular rate and rhythm, S1, S2 normal, no murmur, click, rub or gallop   Lungs: clear to auscultation bilaterally   Breast: positive findings: erythema on left breast    Neck: Not performed today   Abdomen: Not performed today   CVA: Not performed today   Pelvis: Vulva and vagina appear normal. " Bimanual exam reveals normal uterus and adnexa. Light bleeding noted no pain with exam no CMT   Extremities: Extremities normal, atraumatic, no cyanosis or edema   Neurologic: AOx3. Gait normal. Reflexes and motor strength normal and symmetric. Cranial nerves 2-12 and sensation grossly intact.   Psychiatric: Normal affect, judgement, and mood       Lab Review   Labs: No data reviewed    Imaging   No data reviewed    Assessment & Plan     ASSESSMENT  1. Postpartum fever    2. Vaginal discharge    3. Fever, unspecified fever cause    4. Acute mastitis    5. Nausea without vomiting    6. Chills    7. Body aches    8. Breastfeeding (infant)    9. Mastitis, left, acute          PLAN  1.   Orders Placed This Encounter   Procedures    NuSwab VG+ - Swab, Vagina    CBC (No Diff)    Comprehensive Metabolic Panel    POC Urinalysis Dipstick       2. Medications prescribed this encounter:        New Medications Ordered This Visit   Medications    dicloxacillin (DYNAPEN) 500 MG capsule     Sig: Take 1 capsule by mouth 4 (Four) Times a Day for 10 days.     Dispense:  40 capsule     Refill:  0       3. Will check some labs  Suspect mastitis  Will r/o infection with nu swab  Start antibiotic   Go to ED if fever continues or feels worse warning s/s reviewed stressed importance  Continue fluids   I spent 30 minutes caring for Nicho George on this date of service. This time includes time spent by me in the following activities: preparing for the visit, reviewing tests, obtaining and/or reviewing a separately obtained history, performing a medically appropriate examination and/or evaluation, counseling and educating the patient/family/caregiver, ordering medications, tests, or procedures and documenting information in the medical record.  Follow up: as needed and in 1 week    Sylvia Hamilton, DYLON  5/31/2024

## 2024-05-31 NOTE — TELEPHONE ENCOUNTER
Pts boyfriend called stating pt had a temp last nite of 102 and today a101.7 Pt is breastfeeding but denies,red streaks,pain,tenderness.Pt denies abd tenderness,cough,diarrhea.Please advise

## 2024-06-01 ENCOUNTER — TELEPHONE (OUTPATIENT)
Dept: OBSTETRICS AND GYNECOLOGY | Age: 29
End: 2024-06-01
Payer: COMMERCIAL

## 2024-06-01 LAB
ALBUMIN SERPL-MCNC: 4.3 G/DL (ref 3.5–5.2)
ALBUMIN/GLOB SERPL: 1.4 G/DL
ALP SERPL-CCNC: 120 U/L (ref 39–117)
ALT SERPL-CCNC: 25 U/L (ref 1–33)
AST SERPL-CCNC: 31 U/L (ref 1–32)
BILIRUB SERPL-MCNC: 1 MG/DL (ref 0–1.2)
BUN SERPL-MCNC: 11 MG/DL (ref 6–20)
BUN/CREAT SERPL: 12.5 (ref 7–25)
CALCIUM SERPL-MCNC: 8.6 MG/DL (ref 8.6–10.5)
CHLORIDE SERPL-SCNC: 100 MMOL/L (ref 98–107)
CO2 SERPL-SCNC: 24.4 MMOL/L (ref 22–29)
CREAT SERPL-MCNC: 0.88 MG/DL (ref 0.57–1)
EGFRCR SERPLBLD CKD-EPI 2021: 92.5 ML/MIN/1.73
ERYTHROCYTE [DISTWIDTH] IN BLOOD BY AUTOMATED COUNT: 13.4 % (ref 12.3–15.4)
GLOBULIN SER CALC-MCNC: 3 GM/DL
GLUCOSE SERPL-MCNC: 91 MG/DL (ref 65–99)
HCT VFR BLD AUTO: 41.2 % (ref 34–46.6)
HGB BLD-MCNC: 14.1 G/DL (ref 12–15.9)
MCH RBC QN AUTO: 30 PG (ref 26.6–33)
MCHC RBC AUTO-ENTMCNC: 34.2 G/DL (ref 31.5–35.7)
MCV RBC AUTO: 87.7 FL (ref 79–97)
PLATELET # BLD AUTO: 260 10*3/MM3 (ref 140–450)
POTASSIUM SERPL-SCNC: 3.8 MMOL/L (ref 3.5–5.2)
PROT SERPL-MCNC: 7.3 G/DL (ref 6–8.5)
RBC # BLD AUTO: 4.7 10*6/MM3 (ref 3.77–5.28)
SODIUM SERPL-SCNC: 138 MMOL/L (ref 136–145)
WBC # BLD AUTO: 10.04 10*3/MM3 (ref 3.4–10.8)

## 2024-06-01 NOTE — TELEPHONE ENCOUNTER
Called pt's partner to check on her status. He notes she did not start the antibiotic until today and she has taken 2 doses. She had a recurrent temp to 103 earlier today but decreased to 101 recently. She has not taken any tylenol or motrin since yesterday.  She has a mild headache and left breast tenderness but no dizziness.  She denies any heavy bleeding or abdominal pain. Discussed with pt's spouse that she needs to proceed to the ER if she has recurrent temp to 103 or worsening symptoms. Advised she should feel better and note resolving fever with the next doses of antibiotic. Advised she needs to pump/feed from the breast every 2 to 3 hours to keep it evacuated. He notes understanding. Forwarded message to MA to schedule office f/u for pt on 6/3 or 6/4.

## 2024-06-03 DIAGNOSIS — B96.89 BV (BACTERIAL VAGINOSIS): Primary | ICD-10-CM

## 2024-06-03 DIAGNOSIS — N76.0 BV (BACTERIAL VAGINOSIS): Primary | ICD-10-CM

## 2024-06-03 LAB
A VAGINAE DNA VAG QL NAA+PROBE: ABNORMAL SCORE
BVAB2 DNA VAG QL NAA+PROBE: ABNORMAL SCORE
C ALBICANS DNA VAG QL NAA+PROBE: NEGATIVE
C GLABRATA DNA VAG QL NAA+PROBE: NEGATIVE
C TRACH DNA VAG QL NAA+PROBE: NEGATIVE
MEGA1 DNA VAG QL NAA+PROBE: ABNORMAL SCORE
N GONORRHOEA DNA VAG QL NAA+PROBE: NEGATIVE
T VAGINALIS DNA VAG QL NAA+PROBE: NEGATIVE

## 2024-06-03 RX ORDER — METRONIDAZOLE 500 MG/1
500 TABLET ORAL 2 TIMES DAILY
Qty: 14 TABLET | Refills: 0 | Status: SHIPPED | OUTPATIENT
Start: 2024-06-03 | End: 2024-06-10

## 2024-06-03 RX ORDER — CLINDAMYCIN HYDROCHLORIDE 300 MG/1
300 CAPSULE ORAL 4 TIMES DAILY
Qty: 40 CAPSULE | Refills: 0 | Status: CANCELLED | OUTPATIENT
Start: 2024-06-03 | End: 2024-06-13

## 2024-06-03 NOTE — TELEPHONE ENCOUNTER
Left vm on husbands and pt phone to make sure she picked up antibiotic and started tylenol or motrin

## 2024-06-03 NOTE — TELEPHONE ENCOUNTER
Spoke with patient , Pt  stating patient was better over the weekend with no fever. Patient scheduled with BAYLEE Thompson on 06/04/2024

## 2024-06-04 ENCOUNTER — POSTPARTUM VISIT (OUTPATIENT)
Dept: OBSTETRICS AND GYNECOLOGY | Age: 29
End: 2024-06-04
Payer: COMMERCIAL

## 2024-06-04 VITALS
DIASTOLIC BLOOD PRESSURE: 64 MMHG | WEIGHT: 136 LBS | SYSTOLIC BLOOD PRESSURE: 100 MMHG | BODY MASS INDEX: 25.03 KG/M2 | HEIGHT: 62 IN

## 2024-06-04 DIAGNOSIS — N61.0 ACUTE MASTITIS: Primary | ICD-10-CM

## 2024-06-04 PROCEDURE — 99213 OFFICE O/P EST LOW 20 MIN: CPT | Performed by: PHYSICIAN ASSISTANT

## 2024-06-04 NOTE — PROGRESS NOTES
"Subjective     Chief Complaint   Patient presents with    Postpartum Care     Gyn f/u mastitis , pt also wanted to follow up on previous UTI and medication via mychart result       Nicho George is a 29 y.o.  whose LMP is No LMP recorded. presents for f/u of mastitis  She is here with  and daughter  Her  interprets for her    She was treated last week for mastitis  Has been taking the antibiotic as prescribed  Her sxs have improved  Her breast pain has decreased and the redness is gone  She has not had a fever in a couple of days    Denies bladder issues    Has good support  No PPD      EPD Scale: Thought of Harming Self: 0-->never  [place]  Depression Score: 0     Labs reviewed and white count is wnl  CBC (No Diff) (2024 15:42)     No Additional Complaints Reported    The following portions of the patient's history were reviewed and updated as appropriate:no additional history reviewed, vital signs, allergies, current medications, past medical history, past social history, past surgical history, and problem list      Review of Systems   Genitourinary:positive for mastitis f/u     Objective      /64   Ht 157 cm (61.81\")   Wt 61.7 kg (136 lb)   Breastfeeding Yes   BMI 25.03 kg/m²     Physical Exam    General:   alert, comfortable, and no distress   Heart: Not performed today   Lungs: Not performed today.   Breast: normal appearance, no masses or tenderness, No nipple retraction or dimpling, No axillary or supraclavicular adenopathy, Normal to palpation without dominant masses, right nipple with some open areas noted, no oozing or drainage noted   Neck: Not performed today   Abdomen: Not performed today   CVA: Not performed today   Pelvis: Not performed today   Extremities: Not performed today   Neurologic: negative   Psychiatric: Normal affect, judgement, and mood       Lab Review   Labs: vaginal swab, UA    Imaging   No data reviewed    Assessment & Plan "     ASSESSMENT  1. Acute mastitis          PLAN  1. Pt's symptoms are improving. Complete ab and discussed things to look for moving forward. Call for similar sxs. C/w good BF and start using APNO as well. Keep f/u pp appt on the 21st June as scheduled.     2. Medications prescribed this encounter:        New Medications Ordered This Visit   Medications    All Purpose Nipple Ointment     Sig: Apply  topically to the appropriate area as directed Every 2 (Two) Hours As Needed (use on affected area twice daily to three times daily).     Dispense:  45 g     Refill:  1           Follow up: 4 week(s)    MANDY Deleon  6/4/2024

## 2024-06-21 ENCOUNTER — POSTPARTUM VISIT (OUTPATIENT)
Dept: OBSTETRICS AND GYNECOLOGY | Age: 29
End: 2024-06-21
Payer: COMMERCIAL

## 2024-06-21 VITALS
WEIGHT: 134.4 LBS | HEIGHT: 62 IN | DIASTOLIC BLOOD PRESSURE: 66 MMHG | SYSTOLIC BLOOD PRESSURE: 112 MMHG | BODY MASS INDEX: 24.73 KG/M2

## 2024-06-21 RX ORDER — PRENATAL VIT/IRON FUM/FOLIC AC 27MG-0.8MG
TABLET ORAL DAILY
COMMUNITY

## 2024-06-21 NOTE — PROGRESS NOTES
"Sergei George is a 29 y.o. female who presents for a postpartum visit. She is 5 weeks postpartum following a spontaneous vaginal delivery. I have fully reviewed the prenatal and intrapartum course. The delivery was at 38 gestational weeks. Outcome: spontaneous vaginal delivery. Anesthesia: epidural. Postpartum course has been complicated by mastitis that pt notes resolved completely. Baby's course has been normal. Baby is feeding by breast. Bleeding no bleeding. Bowel function is normal. Bladder function is normal. Patient is not sexually active. Contraception method is IUD, pt desires Paragard. Postpartum depression screening: negative.    The following portions of the patient's history were reviewed and updated as appropriate: allergies, current medications, past family history, past medical history, past social history, past surgical history, and problem list.    Review of Systems  Pertinent items are noted in HPI.    Objective   /66   Ht 157 cm (61.81\")   Wt 61 kg (134 lb 6.4 oz)   LMP  (LMP Unknown)   Breastfeeding Yes   BMI 24.73 kg/m²    General:  alert, appears stated age, cooperative, and no distress    Breasts:  def   Lungs: Normal respiratory effort   Heart:  regular rate and rhythm   Abdomen: Soft, nontender    Vulva:  normal   Vagina: normal vagina, no discharge, exudate, lesion, or erythema   Cervix:  no lesions   Corpus: normal size, contour, position, consistency, mobility, non-tender   Adnexa:  no mass, fullness, tenderness   Rectal Exam: Not performed.     Assessment & Plan   normal postpartum exam. Pap smear done at today's visit.    1. Contraception: IUD-pt requests paragard, risks and benefits reviewed including infection, perforation/imbedding, surgery to remove, expulsion, failure to prevent pregnancy, pelvic pain and abnormal/heavy bleeding. Handout provided.  2. Advised pt to call if she does not receive results of pap. Advised her to abstain until IUD " insertion.   3. Follow up in: 3 weeks for IUD insertion or as needed.      Office interpretor present for visit and counseling

## 2024-06-25 LAB
CONV .: NORMAL
CYTOLOGIST CVX/VAG CYTO: NORMAL
CYTOLOGY CVX/VAG DOC CYTO: NORMAL
CYTOLOGY CVX/VAG DOC THIN PREP: NORMAL
DX ICD CODE: NORMAL
Lab: NORMAL
OTHER STN SPEC: NORMAL
STAT OF ADQ CVX/VAG CYTO-IMP: NORMAL